# Patient Record
Sex: MALE | Race: WHITE | NOT HISPANIC OR LATINO | Employment: FULL TIME | URBAN - METROPOLITAN AREA
[De-identification: names, ages, dates, MRNs, and addresses within clinical notes are randomized per-mention and may not be internally consistent; named-entity substitution may affect disease eponyms.]

---

## 2018-07-06 ENCOUNTER — OFFICE VISIT CONVERTED (OUTPATIENT)
Dept: FAMILY MEDICINE CLINIC | Facility: CLINIC | Age: 38
End: 2018-07-06
Attending: FAMILY MEDICINE

## 2018-08-06 ENCOUNTER — OFFICE VISIT CONVERTED (OUTPATIENT)
Dept: FAMILY MEDICINE CLINIC | Facility: CLINIC | Age: 38
End: 2018-08-06
Attending: FAMILY MEDICINE

## 2018-09-20 ENCOUNTER — TELEPHONE (OUTPATIENT)
Dept: NEUROSURGERY | Facility: CLINIC | Age: 38
End: 2018-09-20

## 2018-09-20 NOTE — TELEPHONE ENCOUNTER
I called and LVM for patient regarding their new patient packet that was mailed out on 08/28/18. We have not received their packet and if we do not receive it by 9/21/18 by 5 pm, we will have to cancel their appointment. We ask that all new patient packets be back within 3 business days of their scheduled appointment.

## 2018-09-20 NOTE — TELEPHONE ENCOUNTER
Called and LVM about new patient packet and if we do not receive it tomorrow by 5 pm, we will cancel his appointment.

## 2020-07-17 ENCOUNTER — OFFICE VISIT CONVERTED (OUTPATIENT)
Dept: FAMILY MEDICINE CLINIC | Facility: CLINIC | Age: 40
End: 2020-07-17
Attending: FAMILY MEDICINE

## 2020-07-17 ENCOUNTER — TRANSCRIBE ORDERS (OUTPATIENT)
Dept: ADMINISTRATIVE | Facility: HOSPITAL | Age: 40
End: 2020-07-17

## 2020-07-17 DIAGNOSIS — R32 URINARY INCONTINENCE, UNSPECIFIED TYPE: ICD-10-CM

## 2020-07-17 DIAGNOSIS — M54.50 LOW BACK PAIN, UNSPECIFIED BACK PAIN LATERALITY, UNSPECIFIED CHRONICITY, UNSPECIFIED WHETHER SCIATICA PRESENT: Primary | ICD-10-CM

## 2020-07-30 ENCOUNTER — HOSPITAL ENCOUNTER (OUTPATIENT)
Dept: MRI IMAGING | Facility: HOSPITAL | Age: 40
Discharge: HOME | End: 2020-07-30
Admitting: FAMILY MEDICINE

## 2020-07-30 DIAGNOSIS — R32 URINARY INCONTINENCE, UNSPECIFIED TYPE: ICD-10-CM

## 2020-07-30 DIAGNOSIS — M54.50 LOW BACK PAIN, UNSPECIFIED BACK PAIN LATERALITY, UNSPECIFIED CHRONICITY, UNSPECIFIED WHETHER SCIATICA PRESENT: ICD-10-CM

## 2020-07-30 PROCEDURE — A9575 INJ GADOTERATE MEGLUMI 0.1ML: HCPCS | Performed by: FAMILY MEDICINE

## 2020-07-30 PROCEDURE — 72158 MRI LUMBAR SPINE W/O & W/DYE: CPT

## 2020-07-30 PROCEDURE — 25010000002 GADOTERATE MEGLUMINE 7.5 MMOL/15ML SOLUTION: Performed by: FAMILY MEDICINE

## 2020-07-30 RX ORDER — OXCARBAZEPINE 150 MG/1
150 TABLET, FILM COATED ORAL 2 TIMES DAILY
COMMUNITY

## 2020-07-30 RX ORDER — LORAZEPAM 1 MG/1
1 TABLET ORAL AS NEEDED
COMMUNITY

## 2020-07-30 RX ORDER — GADOTERATE MEGLUMINE 376.9 MG/ML
12 INJECTION INTRAVENOUS
Status: COMPLETED | OUTPATIENT
Start: 2020-07-30 | End: 2020-07-30

## 2020-07-30 RX ORDER — TRAZODONE HYDROCHLORIDE 50 MG/1
50 TABLET ORAL DAILY
COMMUNITY

## 2020-07-30 RX ADMIN — GADOTERATE MEGLUMINE 12 ML: 376.9 INJECTION, SOLUTION INTRAVENOUS at 11:40

## 2020-08-13 ENCOUNTER — TRANSCRIBE ORDERS (OUTPATIENT)
Dept: ADMINISTRATIVE | Facility: HOSPITAL | Age: 40
End: 2020-08-13

## 2020-08-13 DIAGNOSIS — M54.9 BILATERAL BACK PAIN, UNSPECIFIED BACK LOCATION, UNSPECIFIED CHRONICITY: Primary | ICD-10-CM

## 2020-08-27 ENCOUNTER — HOSPITAL ENCOUNTER (OUTPATIENT)
Dept: MRI IMAGING | Facility: HOSPITAL | Age: 40
Discharge: HOME | End: 2020-08-27
Admitting: FAMILY MEDICINE

## 2020-08-27 DIAGNOSIS — M54.9 BILATERAL BACK PAIN, UNSPECIFIED BACK LOCATION, UNSPECIFIED CHRONICITY: ICD-10-CM

## 2020-08-27 PROCEDURE — 72157 MRI CHEST SPINE W/O & W/DYE: CPT

## 2020-08-27 PROCEDURE — 25010000002 GADOTERATE MEGLUMINE 7.5 MMOL/15ML SOLUTION: Performed by: FAMILY MEDICINE

## 2020-08-27 PROCEDURE — A9575 INJ GADOTERATE MEGLUMI 0.1ML: HCPCS | Performed by: FAMILY MEDICINE

## 2020-08-27 RX ORDER — GADOTERATE MEGLUMINE 376.9 MG/ML
12 INJECTION INTRAVENOUS
Status: COMPLETED | OUTPATIENT
Start: 2020-08-27 | End: 2020-08-27

## 2020-08-27 RX ADMIN — GADOTERATE MEGLUMINE 12 ML: 376.9 INJECTION, SOLUTION INTRAVENOUS at 20:24

## 2020-12-16 ENCOUNTER — CONVERSION ENCOUNTER (OUTPATIENT)
Dept: NEUROLOGY | Facility: CLINIC | Age: 40
End: 2020-12-16

## 2020-12-16 ENCOUNTER — OFFICE VISIT CONVERTED (OUTPATIENT)
Dept: NEUROLOGY | Facility: CLINIC | Age: 40
End: 2020-12-16
Attending: PSYCHIATRY & NEUROLOGY

## 2020-12-21 ENCOUNTER — OFFICE VISIT CONVERTED (OUTPATIENT)
Dept: ORTHOPEDIC SURGERY | Facility: CLINIC | Age: 40
End: 2020-12-21
Attending: STUDENT IN AN ORGANIZED HEALTH CARE EDUCATION/TRAINING PROGRAM

## 2021-05-10 NOTE — H&P
History and Physical      Patient Name: Sandeep Mcdonald   Patient ID: [id number]   Sex: Male   YOB: 1980    Primary Care Provider: Nikunj Manning DO   Referring Provider: Nikunj Manning DO    Visit Date: December 21, 2020    Provider: Alvaro Bland MD   Location: [facility]   Location Address: [address] [place] [address]  [id number]   Location Phone: [phone]          Chief Complaint  · right elbow pain      History Of Present Illness  Sandeep Mcdonald is a 40 year old /White male who presents today to [facility].      Sandeep presents to the office today for evaluation of his right arm. He has been experiencing numbness and tingling of the palm, ring, and small fingers of his right hand since about 2015. He states that he had a cubital tunnel release preformed on the left side in 2015. He mentions that he had 3 shoulder and an ankle operation in the past. He also had a new job so it made it difficult to have time for his right arm to be examined. He states that he is trying to have it examined now before he has a change in his insurance. He has obtained an EMG, however it reveals it does not reveal and ulnar nerve compression. He mentions that he is right-hand dominant however due to the severity and frequency of the numbness he utilizes his left hand more now. He mentions he has attempted life style modifications and different positioning, however there has been no improvement. He explains that when he has his elbow flexed for longer than 5 minutes, he starts to find numbness and tingling radiating down the arm to the ring and small finger. He denies any associated weakness. He is a  and preforms computer editing. He mentions that he has been waking up during the night with his hand numb. He mentions that he has an appointment today with Dr. Cerna, a hand specialist in [place]. However he canceled that and presented to us due to having to  "wait until February for a time for a possible operation. He states that this pain/numbness/tingling is affecting his daily lifestyle now. He is a nonsmoker. He is non-diabetic. He is otherwise overall healthy.                 Past Medical History  Abdominal Pain, RUQ; Facial rash; Gastritis; Hemorrhoids; Paresthesia of left leg; Perennial allergic rhinitis; Shoulder Impingement; Shoulder pain; Swollen lymph nodes; torn labrum; Ulnar neuropathy         Past Surgical History  Ankle surgery; Biopsy of groin; EGD; Shoulder surgery         Medication List  ibuprofen 400 mg oral tablet; Tylenol oral         Allergy List  NO KNOWN DRUG ALLERGIES       Allergies Reconciled  Family Medical History  Spine Problems         Social History  Alcohol (Light); Exercises regularly; Tobacco (Never)         Review of Systems  · Constitutional  o Denies  o : fever, chills, weight loss  · Cardiovascular  o Denies  o : chest pain, shortness of breath  · Gastrointestinal  o Denies  o : liver disease, heartburn, nausea, blood in stools  · Genitourinary  o Denies  o : painful urination, blood in urine  · Integument  o Denies  o : rash, itching  · Neurologic  o Denies  o : headache, weakness, loss of consciousness  · Musculoskeletal  o Admits  o : right arm tingling and numbness.   · Psychiatric  o Denies  o : drug/alcohol addiction, anxiety, depression      Vitals  Date Time BP Position Site L\R Cuff Size HR RR TEMP (F) WT  HT  BMI kg/m2 BSA m2 O2 Sat FR L/min FiO2        12/21/2020 01:30 PM      99 - R   142lbs 0oz 5'  6\" 22.92 1.73 98 %            Physical Examination  · Constitutional  o Appearance  o : well developed, well-nourished, no obvious deformities present  · Head and Face  o Head  o :   § Inspection  § : normocephalic  o Face  o :   § Inspection  § : no facial lesions  · Eyes  o Conjunctivae  o : conjunctivae normal  o Sclerae  o : sclerae white  · Ears, Nose, Mouth and Throat  o Ears  o :   § External Ears  § : appearance " within normal limits  § Hearing  § : intact  o Nose  o :   § External Nose  § : appearance normal  · Neck  o Inspection/Palpation  o : normal appearance  o Range of Motion  o : full range of motion  · Respiratory  o Respiratory Effort  o : breathing unlabored  o Inspection of Chest  o : normal appearance  o Auscultation of Lungs  o : no audible wheezing or rales  · Cardiovascular  o Heart  o : regular rate  · Gastrointestinal  o Abdominal Examination  o : soft and non-tender  · Skin and Subcutaneous Tissue  o General Inspection  o : intact, no rashes  · Psychiatric  o General  o : Alert and oriented x3  o Judgement and Insight  o : judgment and insight intact  o Mood and Affect  o : mood normal, affect appropriate  · Right Arm  o Inspection  o : No wounds. No swelling. No bruises. No muscle atrophy in the hand. Negative Tinel's over the ulnar nerve in the cubital tunnel. With elbow flexion and compression across the cubital tunnel, there is increased tingling radiating to the ring and small fingers. No appreciable subluxation of the ulnar nerve is noted. Negative Phalen's over carpal tunnel. Motor function intact with AIN, PIN and ulnar nerve distributions with 5/5 strength. Paresthesias in the ulnar nerve distribution, but sensation remains intact to light touch in the median, radial, and ulnar nerve distributions. Palpable radial pulse.           Assessment  · Right elbow pain     719.42/M25.521  · Cubital tunnel syndrome     354.2/G56.20      Plan  · Medications  o Medications have been Reconciled  o Transition of Care or Provider Policy  · Instructions  o EMG reviewed by Dr. Bland.  o Reviewed the patient's Past Medical, Social, and Family history as well as the ROS at today's visit, no changes.  o Call or return if worsening symptoms.  o Follow Up PRN.  o The above service was scribed by Rissa Shahid on my behalf and I attest to the accuracy of the note. cb  o Sandeep has symptoms of paresthesias in the  ulnar nerve distribution of the right upper extremity. He has an EMG that does not show any static compression of the nerve at the cubital tunnel. His nerve does not grossly sublux on exam but it does have some increased mobility. My concern is this may be more of a dynamic compression type situation. A static cubital tunnel release may not fully treat his symptoms. He may require an ulnar nerve transposition. He already has contact with a upper extremity/hand specialist in [place]. He would like to follow-up with him for a second opinion. I think that is reasonable. He may call me with any concerns going forward.            Electronically Signed by: Alvaro Bland MD -Author on December 22, 2020 03:38:33 PM

## 2021-05-10 NOTE — H&P
History and Physical      Patient Name: Sandeep Mcdonald   Patient ID: [id number]   Sex: Male   YOB: 1980    Primary Care Provider: Nikunj Manning DO    Visit Date: December 16, 2020    Provider: Nguyễn Salas MD   Location: [facility]   Location Address: [address] [place] [address]  [id number]   Location Phone: [phone]          Chief Complaint     New pt visit for R elbow ulnar nerve entrapment.       History Of Present Illness  Sandeep Mcdonald is a 40 year old /White male who presents today to [facility] today referred from Nikunj Manning DO.      40-year-old man evaluated for right hand numbness and tingling for the past 5 years.  He states that he had ulnar neuropathy in the left elbow which had surgery several years ago and it helped him significantly.  He states that the symptoms are the same on the right hand and he is here for nerve conduction study.  He states that the pinky and ring finger is numb and he goes to the palm of his hand and sometimes in his medial forearm.  It happens when he is bending his arm.  He states that he cannot read a book he cannot do activities that he gets numb and tingly.  It can happen several times a day.       Past Medical History  Abdominal Pain, RUQ; Facial rash; Gastritis; Hemorrhoids; Paresthesia of left leg; Perennial allergic rhinitis; Shoulder Impingement; Shoulder pain; Swollen lymph nodes; torn labrum; Ulnar neuropathy         Past Surgical History  Ankle surgery; Biopsy of groin; EGD; Shoulder surgery         Medication List  ibuprofen 400 mg oral tablet; Tylenol oral         Allergy List  NO KNOWN DRUG ALLERGIES         Family Medical History  Spine Problems         Social History  Alcohol (Light); Exercises regularly; Tobacco (Never)         Review of Systems  · Constitutional  o Denies  o : chills, excessive sweating, fatigue, fever, sycope/passing out, weight gain, weight  "loss  · Eyes  o Denies  o : changes in vision, blurry vision, double vision  · HENT  o Denies  o : loss of hearing, ringing in the ears, ear aches, sore throat, nasal congestion, sinus pain, nose bleeds, seasonal allergies  · Cardiovascular  o Denies  o : blood clots, swollen legs, anemia, easy burising or bleeding, transfusions  · Respiratory  o Denies  o : shortness of breath, dry cough, productive cough, pneumonia, COPD  · Gastrointestinal  o Denies  o : difficulty swallowing, reflux  · Genitourinary  o Denies  o : incontinence  · Neurologic  o Admits  o : numbness/tingling/paresthesia , difficulty with dexterity  o Denies  o : headache, seizure, stroke, tremor, loss of balance, falls, dizziness/vertigo, difficulty with sleep, difficulty with coordination, weakness  · Musculoskeletal  o Admits  o : neck stiffness/pain, muscle aches, joint pain, spasms, sciatica, pain radiating in arm, pain radiating in leg, low back pain  o Denies  o : swollen lymph nodes, weakness  · Endocrine  o Denies  o : diabetes, thyroid disorder  · Psychiatric  o Denies  o : anxiety, depression      Vitals  Date Time BP Position Site L\R Cuff Size HR RR TEMP (F) WT  HT  BMI kg/m2 BSA m2 O2 Sat FR L/min FiO2 HC       12/16/2020 02:58 /72 Sitting    86 - R   141lbs 0oz 5'  6\" 22.76 1.73             Physical Examination     There is no weakness of the upper extremities and with muscle testing.  Specifically there is no weakness in the distribution of the ulnar nerve.  Reflex are normoactive and symmetrical in the triceps, biceps and brachioradialis.  Tinel's sign is negative at the left elbow.           Assessment  · Carpal tunnel syndrome     354.0/G56.00  I would also recommend for him to be conservatively treated for carpal tunnel symptoms since his symptoms of bending his wrist and elbows may contribute to his symptoms.    20 minutes was spent for this straightforward complexity encounter more half the time was spent face-to-face " with the patient for examination, counseling, planning and recommendations.  · Numbness and tingling       Anesthesia of skin     782.0/R20.0  Paresthesia of skin     782.0/R20.2  The study is normal and does not show electrophysiologic evidence for median neuropathy at the wrist or ulnar neuropathy at the elbow on the right arm.  · Ulnar neuropathy at elbow of right upper extremity     354.2/G56.21  I discussed with him that he should use conservative measures such as elbow splinting and avoid leaning his elbows as well as keeping his arm straight most of the time for the next several weeks to see if it helps his symptoms. He states that he is already tired of doing this and he just wants treatment. I discussed with him that I will leave it up to him and his orthopedic surgeon whether or not they are going to do decompression of the ulnar nerve at the elbow with a normal study.      Plan  · Orders  o Nerve conduction studies; 3-4 studies (17188) - 354.0/G56.00, 782.0/R20.0, 782.0/R20.2, 354.2/G56.21 - 12/16/2020  · Medications  o Medications have been Reconciled  o Transition of Care or Provider Policy  · Instructions  o Encouraged to follow-up with Primary Care Provider for preventative care.            Electronically Signed by: Nguyễn Salas MD -Author on December 16, 2020 03:45:15 PM

## 2021-05-13 NOTE — PROGRESS NOTES
Progress Note      Patient Name: Sandeep Mcdonald   Patient ID: [id number]   Sex: Male   YOB: 1980    Primary Care Provider: Nikunj Manning DO    Visit Date: July 17, 2020    Provider: Nikunj Manning DO   Location: [facility]   Location Address: [address] [place] [address]  [id number]   Location Phone: [phone]          Chief Complaint  · pt c/o low back pain, pt is currently seeing urology for all other issues      History Of Present Illness  Sandeep Mcdonald is a 39 year old /White male who presents for evaluation and treatment of: back pain. He states that he has chronic lower back pain. It has been getting worse over the past year. He has been going to PT as ordered by Chiropractor. He states that it did not provide any benefit. His last MRI was about 5 years ago. He has a lot of associated spasm. He has also had some urinary issues causing difficulty urinating.       Past Medical History  Disease Name Date Onset Notes   Abdominal Pain, RUQ 06/03/2016 His w/u so far was negative. Will check a HIDA scan and if negative to see GI for endoscopy.    Facial rash 05/06/2016 --    Gastritis 07/12/2016 --    Hemorrhoids 04/21/2017 --    Paresthesia of left leg 06/16/2017 --    Perennial allergic rhinitis 08/06/2018 --    Shoulder Impingement 02/23/2015 --    Shoulder pain --  --    Swollen lymph nodes --  --    torn labrum 01/20/2015 --    Ulnar neuropathy 05/06/2016 --          Past Surgical History  Procedure Name Date Notes   Ankle surgery 2014 left ankle , tendon and ligament   Biopsy of groin 2008 swollen lymphnode   EGD 2016 --    Shoulder surgery --  left shoulder 2001 , labrum tear right shoulder 2013, labrum left shoulder 2015, labrum         Medication List  Name Date Started Instructions   Ativan 1 mg oral tablet  take 1 tablet (1 mg) by oral [id number] daily   tamsulosin 0.4 mg oral capsule  take 1 capsule by oral route 2 times a day   trazodone 50  "mg oral tablet  take 1 tablet (50 mg) by oral route once daily at bedtime   Trileptal 150 mg oral tablet  take 1 tablet by oral route 2 times per day         Allergy List  Allergen Name Date Reaction Notes   NO KNOWN DRUG ALLERGIES --  --  --          Family Medical History  Disease Name Relative/Age Notes   Spine Problems  --          Social History  Finding Status Start/Stop Quantity Notes   Alcohol Never --/-- --  --    Exercises regularly --  --/-- --  --    Tobacco Never --/-- --  --          Review of Systems  · Constitutional  o Denies  o : fatigue  · Genitourinary  o Admits  o : urgency, frequency, incontinence, urinary retention, decreased stream  · Neurologic  o Admits  o : tingling or numbness  · Musculoskeletal  o Admits  o : back pain      Vitals  Date Time BP Position Site L\R Cuff Size HR RR TEMP (F) WT  HT  BMI kg/m2 BSA m2 O2 Sat HC       07/06/2018 12:19 /70 Sitting    87 - R   139lbs 0oz 5'  6\" 22.43 1.71     08/06/2018 02:54 /60 Sitting    79 - R   142lbs 0oz 5'  6\" 22.92 1.73     07/17/2020 02:07 /62 Sitting    103 - R  99.3 137lbs 0oz 5'  6\" 22.11 1.7 99 %          Physical Examination  · Constitutional  o Appearance  o : well-nourished, well developed, no obvious deformities present  · Gastrointestinal  o Abdominal Examination  o : diffuse abdominal tenderness to palpation present, normal bowel sounds, tone normal without rigidity or guarding, no masses present  o Liver and spleen  o : no hepatomegaly present  · Neurologic  o Motor Examination  o :   § RLE Strength  § : strength normal  § LLE Strength  § : strength normal  o Reflexes  o :   § RLE  § : patellar reflex 2, ankle reflex 2  § LLE  § : patellar reflex 2, ankle reflex 2  o Sensation  o :   § Light Touch  § : sensation intact to light touch in extremities  · Back  o Inspection  o : no deformities  o Palpation  o : tednerness present           Assessment  · Screening for depression     V79.0/Z13.89  · Lower back " pain     724.2/M54.5  Needs a MRI to r/o lumbar etiology  · Urinary incontinence     788.30/R32  as above      Plan  · Orders  o ACO-18: Negative screen for clinical depression using a standardized tool () - V79.0/Z13.89 - 07/17/2020  o ACO-39: Current medications updated and reviewed () - - 07/17/2020  o MRI lumbar spine w contrast (45880) - 724.2/M54.5, 788.30/R32 - 07/17/2020  · Medications  o cyclobenzaprine 5 mg oral tablet   SIG: take 1 tablet by oral route every 8 hours as needed for 10 days   DISP: (30) tablets with 0 refills  Prescribed on 07/17/2020     o Medications have been Reconciled  o Transition of Care or Provider Policy  · Instructions  o Depression Screen completed and scanned into the EMR under the designated folder within the patient's documents.  o Today's PHQ-9 result is 2  o Patient is taking medications as prescribed and doing well.   o Take all medications as prescribed/directed.  o Patient instructed/educated on their diet and exercise program.  o Patient was educated/instructed on their diagnosis, treatment and medications prior to discharge from the clinic today.  o Patient instructed to seek medical attention urgently for new or worsening symptoms.  o Call the office with any concerns or questions.  o Bring all medicines with their bottles to each office visit.  · Disposition  o Call or Return if symptoms worsen or persist.  o f/u after testing            Electronically Signed by: Nikunj Manning, DO -Author on July 17, 2020 02:33:39 PM

## 2021-05-14 VITALS
WEIGHT: 141 LBS | DIASTOLIC BLOOD PRESSURE: 72 MMHG | HEIGHT: 66 IN | BODY MASS INDEX: 22.66 KG/M2 | SYSTOLIC BLOOD PRESSURE: 117 MMHG | HEART RATE: 86 BPM

## 2021-05-14 VITALS — OXYGEN SATURATION: 98 % | HEART RATE: 99 BPM | HEIGHT: 66 IN | WEIGHT: 142 LBS | BODY MASS INDEX: 22.82 KG/M2

## 2021-05-15 VITALS
DIASTOLIC BLOOD PRESSURE: 62 MMHG | HEART RATE: 103 BPM | OXYGEN SATURATION: 99 % | HEIGHT: 66 IN | TEMPERATURE: 99.3 F | SYSTOLIC BLOOD PRESSURE: 110 MMHG | BODY MASS INDEX: 22.02 KG/M2 | WEIGHT: 137 LBS

## 2021-05-16 VITALS
DIASTOLIC BLOOD PRESSURE: 70 MMHG | WEIGHT: 139 LBS | HEART RATE: 87 BPM | BODY MASS INDEX: 22.34 KG/M2 | HEIGHT: 66 IN | SYSTOLIC BLOOD PRESSURE: 110 MMHG

## 2021-05-16 VITALS
DIASTOLIC BLOOD PRESSURE: 60 MMHG | HEIGHT: 66 IN | HEART RATE: 79 BPM | BODY MASS INDEX: 22.82 KG/M2 | SYSTOLIC BLOOD PRESSURE: 109 MMHG | WEIGHT: 142 LBS

## 2021-12-02 ENCOUNTER — OFFICE VISIT (OUTPATIENT)
Dept: FAMILY MEDICINE CLINIC | Facility: CLINIC | Age: 41
End: 2021-12-02

## 2021-12-02 VITALS
TEMPERATURE: 97.6 F | HEIGHT: 66 IN | SYSTOLIC BLOOD PRESSURE: 98 MMHG | BODY MASS INDEX: 22.18 KG/M2 | OXYGEN SATURATION: 98 % | HEART RATE: 70 BPM | DIASTOLIC BLOOD PRESSURE: 71 MMHG | WEIGHT: 138 LBS

## 2021-12-02 DIAGNOSIS — R11.0 NAUSEA: ICD-10-CM

## 2021-12-02 DIAGNOSIS — R19.7 DIARRHEA, UNSPECIFIED TYPE: ICD-10-CM

## 2021-12-02 DIAGNOSIS — R10.11 RIGHT UPPER QUADRANT ABDOMINAL PAIN: ICD-10-CM

## 2021-12-02 DIAGNOSIS — R14.0 BLOATING: ICD-10-CM

## 2021-12-02 DIAGNOSIS — R21 RASH: ICD-10-CM

## 2021-12-02 PROBLEM — M25.519 SHOULDER PAIN: Status: ACTIVE | Noted: 2021-12-02

## 2021-12-02 PROBLEM — M19.90 ARTHRITIS: Status: ACTIVE | Noted: 2021-12-02

## 2021-12-02 PROBLEM — K64.9 HEMORRHOIDS: Status: ACTIVE | Noted: 2017-04-21

## 2021-12-02 PROBLEM — J30.89 PERENNIAL ALLERGIC RHINITIS: Status: ACTIVE | Noted: 2018-08-06

## 2021-12-02 PROBLEM — R20.2 PARESTHESIA OF LOWER EXTREMITY: Status: ACTIVE | Noted: 2017-06-16

## 2021-12-02 LAB
ALBUMIN SERPL-MCNC: 4.9 G/DL (ref 3.5–5.2)
ALBUMIN/GLOB SERPL: 1.8 G/DL
ALP SERPL-CCNC: 65 U/L (ref 39–117)
ALT SERPL W P-5'-P-CCNC: 35 U/L (ref 1–41)
AMYLASE SERPL-CCNC: 65 U/L (ref 28–100)
ANION GAP SERPL CALCULATED.3IONS-SCNC: 14.1 MMOL/L (ref 5–15)
AST SERPL-CCNC: 21 U/L (ref 1–40)
BASOPHILS # BLD AUTO: 0.07 10*3/MM3 (ref 0–0.2)
BASOPHILS NFR BLD AUTO: 0.8 % (ref 0–1.5)
BILIRUB SERPL-MCNC: 0.8 MG/DL (ref 0–1.2)
BUN SERPL-MCNC: 17 MG/DL (ref 6–20)
BUN/CREAT SERPL: 18.7 (ref 7–25)
CALCIUM SPEC-SCNC: 9.8 MG/DL (ref 8.6–10.5)
CHLORIDE SERPL-SCNC: 102 MMOL/L (ref 98–107)
CO2 SERPL-SCNC: 27.9 MMOL/L (ref 22–29)
CREAT SERPL-MCNC: 0.91 MG/DL (ref 0.76–1.27)
DEPRECATED RDW RBC AUTO: 39.7 FL (ref 37–54)
EOSINOPHIL # BLD AUTO: 0.19 10*3/MM3 (ref 0–0.4)
EOSINOPHIL NFR BLD AUTO: 2.2 % (ref 0.3–6.2)
ERYTHROCYTE [DISTWIDTH] IN BLOOD BY AUTOMATED COUNT: 11.7 % (ref 12.3–15.4)
GFR SERPL CREATININE-BSD FRML MDRD: 92 ML/MIN/1.73
GLOBULIN UR ELPH-MCNC: 2.7 GM/DL
GLUCOSE SERPL-MCNC: 87 MG/DL (ref 65–99)
HCT VFR BLD AUTO: 43.5 % (ref 37.5–51)
HGB BLD-MCNC: 15 G/DL (ref 13–17.7)
IMM GRANULOCYTES # BLD AUTO: 0.02 10*3/MM3 (ref 0–0.05)
IMM GRANULOCYTES NFR BLD AUTO: 0.2 % (ref 0–0.5)
LIPASE SERPL-CCNC: 31 U/L (ref 13–60)
LYMPHOCYTES # BLD AUTO: 2.53 10*3/MM3 (ref 0.7–3.1)
LYMPHOCYTES NFR BLD AUTO: 28.8 % (ref 19.6–45.3)
MCH RBC QN AUTO: 31.9 PG (ref 26.6–33)
MCHC RBC AUTO-ENTMCNC: 34.5 G/DL (ref 31.5–35.7)
MCV RBC AUTO: 92.6 FL (ref 79–97)
MONOCYTES # BLD AUTO: 0.72 10*3/MM3 (ref 0.1–0.9)
MONOCYTES NFR BLD AUTO: 8.2 % (ref 5–12)
NEUTROPHILS NFR BLD AUTO: 5.25 10*3/MM3 (ref 1.7–7)
NEUTROPHILS NFR BLD AUTO: 59.8 % (ref 42.7–76)
NRBC BLD AUTO-RTO: 0 /100 WBC (ref 0–0.2)
PLATELET # BLD AUTO: 412 10*3/MM3 (ref 140–450)
PMV BLD AUTO: 10.3 FL (ref 6–12)
POTASSIUM SERPL-SCNC: 4.7 MMOL/L (ref 3.5–5.2)
PROT SERPL-MCNC: 7.6 G/DL (ref 6–8.5)
RBC # BLD AUTO: 4.7 10*6/MM3 (ref 4.14–5.8)
SODIUM SERPL-SCNC: 144 MMOL/L (ref 136–145)
WBC NRBC COR # BLD: 8.78 10*3/MM3 (ref 3.4–10.8)

## 2021-12-02 PROCEDURE — 96372 THER/PROPH/DIAG INJ SC/IM: CPT | Performed by: NURSE PRACTITIONER

## 2021-12-02 PROCEDURE — 82150 ASSAY OF AMYLASE: CPT | Performed by: NURSE PRACTITIONER

## 2021-12-02 PROCEDURE — 99214 OFFICE O/P EST MOD 30 MIN: CPT | Performed by: NURSE PRACTITIONER

## 2021-12-02 PROCEDURE — 83690 ASSAY OF LIPASE: CPT | Performed by: NURSE PRACTITIONER

## 2021-12-02 PROCEDURE — 80053 COMPREHEN METABOLIC PANEL: CPT | Performed by: NURSE PRACTITIONER

## 2021-12-02 PROCEDURE — 86677 HELICOBACTER PYLORI ANTIBODY: CPT | Performed by: NURSE PRACTITIONER

## 2021-12-02 PROCEDURE — 36415 COLL VENOUS BLD VENIPUNCTURE: CPT | Performed by: NURSE PRACTITIONER

## 2021-12-02 PROCEDURE — 85025 COMPLETE CBC W/AUTO DIFF WBC: CPT | Performed by: NURSE PRACTITIONER

## 2021-12-02 RX ORDER — TAMSULOSIN HYDROCHLORIDE 0.4 MG/1
1 CAPSULE ORAL DAILY
COMMUNITY
Start: 2021-10-27

## 2021-12-02 RX ORDER — METHYLPREDNISOLONE ACETATE 80 MG/ML
80 INJECTION, SUSPENSION INTRA-ARTICULAR; INTRALESIONAL; INTRAMUSCULAR; SOFT TISSUE ONCE
Status: COMPLETED | OUTPATIENT
Start: 2021-12-02 | End: 2021-12-02

## 2021-12-02 RX ORDER — CLOBETASOL PROPIONATE 0.5 MG/G
1 CREAM TOPICAL 2 TIMES DAILY
Qty: 60 G | Refills: 1 | Status: SHIPPED | OUTPATIENT
Start: 2021-12-02

## 2021-12-02 RX ADMIN — METHYLPREDNISOLONE ACETATE 80 MG: 80 INJECTION, SUSPENSION INTRA-ARTICULAR; INTRALESIONAL; INTRAMUSCULAR; SOFT TISSUE at 10:20

## 2021-12-02 NOTE — PROGRESS NOTES
Patient Name: Sandeep Mcdonald  : 1980   MRN: [id number]     Chief Complaint:    Chief Complaint   Patient presents with   • Abdominal Pain     right side    • Rash       History of Present Illness: Sandeep Mcdonald is a 41 y.o. male who is here today for   C/O-abdominal pain RUQ with nausea and diarrhea   She reports the diarrhea has been going on for several years progressively worse   no colonoscopy   Sharp RUQ about 8 weeks prior, increase in stress in the past few months  Also c/o rash for the past 8 weeks to 10 weeks bilateral hands arms behind knees  Rare etoh use   Subjective      Review of Systems:   Review of Systems   Constitutional: Negative for chills and fever.   Respiratory: Negative for cough.    Cardiovascular: Negative for chest pain.   Gastrointestinal: Positive for abdominal pain, diarrhea and nausea.   Genitourinary: Negative for dysuria.        Past Medical History:   Past Medical History:   Diagnosis Date   • Back pain    • Urinary incontinence    • Urinary retention        Past Surgical History:   Past Surgical History:   Procedure Laterality Date   • ANKLE SURGERY Left    • SHOULDER SURGERY Bilateral    • ULNAR NERVE TRANSPOSITION Left        Family History: No family history on file.    Social History:   Social History     Socioeconomic History   • Marital status: Single   Tobacco Use   • Smoking status: Never Smoker   • Smokeless tobacco: Never Used       Medications:     Current Outpatient Medications:   •  Cetirizine HCl 10 MG capsule, Take 10 mg by mouth every night at bedtime., Disp: 90 capsule, Rfl: 1  •  clobetasol (TEMOVATE) 0.05 % cream, Apply 1 application topically to the appropriate area as directed 2 (Two) Times a Day. Apply thin layer to affected area three times daily, Disp: 60 g, Rfl: 1  •  LORazepam (ATIVAN) 1 MG tablet, Take 1 mg by mouth As Needed for Anxiety., Disp: , Rfl:   •  OXcarbazepine (TRILEPTAL) 150 MG tablet, Take 150 mg by mouth 2 (Two) Times a  "Day., Disp: , Rfl:   •  tamsulosin (FLOMAX) 0.4 MG capsule 24 hr capsule, Take 1 capsule by mouth Daily., Disp: , Rfl:   •  traZODone (DESYREL) 50 MG tablet, Take 50 mg by mouth Daily., Disp: , Rfl:   No current facility-administered medications for this visit.    Allergies:   No Known Allergies      Objective     Physical Exam:  Vital Signs:   Vitals:    12/02/21 0939   BP: 98/71   Pulse: 70   Temp: 97.6 °F (36.4 °C)   SpO2: 98%   Weight: 62.6 kg (138 lb)   Height: 167.6 cm (66\")     Body mass index is 22.27 kg/m².     Physical Exam  Cardiovascular:      Rate and Rhythm: Normal rate and regular rhythm.      Heart sounds: Normal heart sounds. No murmur heard.      Pulmonary:      Effort: Pulmonary effort is normal.      Breath sounds: Normal breath sounds.   Abdominal:      General: Bowel sounds are normal.      Palpations: Abdomen is soft.      Tenderness: There is abdominal tenderness.      Comments: RUQ tender to palpation   Musculoskeletal:      Right lower leg: No edema.      Left lower leg: No edema.   Skin:     General: Skin is warm and dry.      Comments: Erythematous plaques bilateral hands, elbows   Neurological:      Mental Status: He is alert.   Psychiatric:         Mood and Affect: Mood normal.         Behavior: Behavior normal.             Assessment / Plan      Assessment/Plan:   Diagnoses and all orders for this visit:    1. Right upper quadrant abdominal pain  -     US Gallbladder; Future  -     Amylase  -     Lipase  -     Helicobacter Pylori, IgA IgG IgM  -     CBC & Differential  -     Clostridium Difficile Toxin - Stool, Per Rectum  -     Ova & Parasite Examination - Stool, Per Rectum  -     Comprehensive Metabolic Panel    2. Nausea  -     US Gallbladder; Future  -     Amylase  -     Lipase  -     Helicobacter Pylori, IgA IgG IgM  -     CBC & Differential  -     Clostridium Difficile Toxin - Stool, Per Rectum  -     Ova & Parasite Examination - Stool, Per Rectum  -     Comprehensive Metabolic " Panel    3. Diarrhea, unspecified type  -     US Gallbladder; Future  -     Amylase  -     Lipase  -     Helicobacter Pylori, IgA IgG IgM  -     CBC & Differential  -     Clostridium Difficile Toxin - Stool, Per Rectum  -     Ova & Parasite Examination - Stool, Per Rectum  -     Comprehensive Metabolic Panel  -     Ambulatory Referral to General Surgery    4. Bloating  -     US Gallbladder; Future  -     Amylase  -     Lipase  -     Helicobacter Pylori, IgA IgG IgM  -     CBC & Differential  -     Clostridium Difficile Toxin - Stool, Per Rectum  -     Ova & Parasite Examination - Stool, Per Rectum  -     Comprehensive Metabolic Panel  -     Ambulatory Referral to General Surgery    5. Rash  -     methylPREDNISolone acetate (DEPO-medrol) injection 80 mg    Other orders  -     Cetirizine HCl 10 MG capsule; Take 10 mg by mouth every night at bedtime.  Dispense: 90 capsule; Refill: 1  -     clobetasol (TEMOVATE) 0.05 % cream; Apply 1 application topically to the appropriate area as directed 2 (Two) Times a Day. Apply thin layer to affected area three times daily  Dispense: 60 g; Refill: 1       Right upper quadrant pain will obtain gallbladder ultrasound labs  Chronic diarrhea will refer to general surgery for colonoscopy  Rash likely eczema will treat with Depo-Medrol 80 in office start Zyrtec daily and provide clobetasol Rheem      Follow Up:   Return if symptoms worsen or fail to improve.    SAMMY Khan      Please note that portions of this note were completed with a voice recognition program.

## 2021-12-03 LAB
H PYLORI IGA SER-ACNC: <9 UNITS (ref 0–8.9)
H PYLORI IGG SER IA-ACNC: 0.1 INDEX VALUE (ref 0–0.79)
H PYLORI IGM SER-ACNC: <9 UNITS (ref 0–8.9)

## 2021-12-10 ENCOUNTER — APPOINTMENT (OUTPATIENT)
Dept: ULTRASOUND IMAGING | Facility: HOSPITAL | Age: 41
End: 2021-12-10

## 2021-12-27 ENCOUNTER — TELEPHONE (OUTPATIENT)
Dept: FAMILY MEDICINE CLINIC | Facility: CLINIC | Age: 41
End: 2021-12-27

## 2021-12-27 NOTE — TELEPHONE ENCOUNTER
LDM to patient that he still has pending stool studies in his chart and if he wishes to still have them done to drop it off to us or if he feels he no longer needs it to give us a call so we can cancel the orders.

## 2022-12-28 ENCOUNTER — TELEPHONE (OUTPATIENT)
Dept: ORTHOPEDIC SURGERY | Facility: CLINIC | Age: 42
End: 2022-12-28

## 2022-12-28 NOTE — TELEPHONE ENCOUNTER
Spray]    


 


[id number]   


 


Adrian   


 


Troponin I   


 


Ur Specific Gravity   


 


Adrian 


 


Troponin I 


 


Ur Specific Gravity 


 


Maricel, I am sending this to you if you could watch for it.    Left message for patient to return call to office or we will call back shortly.    Dr. Lindsey agreed to work him in next Friday.  Wanted to explain to patient that he will be double booked and may have to wait, but we will book him.    Was thinking around 3pm would be a good time.    It is his shoulder.  Need to find out which side.

## 2023-01-06 ENCOUNTER — OFFICE VISIT (OUTPATIENT)
Dept: ORTHOPEDIC SURGERY | Facility: CLINIC | Age: 43
End: 2023-01-06
Payer: COMMERCIAL

## 2023-01-06 VITALS — BODY MASS INDEX: 22.9 KG/M2 | TEMPERATURE: 97.1 F | HEIGHT: 66 IN | WEIGHT: 142.5 LBS | RESPIRATION RATE: 12 BRPM

## 2023-01-06 DIAGNOSIS — R52 PAIN: Primary | ICD-10-CM

## 2023-01-06 DIAGNOSIS — M25.511 CHRONIC RIGHT SHOULDER PAIN: ICD-10-CM

## 2023-01-06 DIAGNOSIS — G89.29 CHRONIC RIGHT SHOULDER PAIN: ICD-10-CM

## 2023-01-06 PROCEDURE — 99203 OFFICE O/P NEW LOW 30 MIN: CPT | Performed by: ORTHOPAEDIC SURGERY

## 2023-01-06 PROCEDURE — 73030 X-RAY EXAM OF SHOULDER: CPT | Performed by: ORTHOPAEDIC SURGERY

## 2023-01-06 RX ORDER — GABAPENTIN 300 MG/1
300 CAPSULE ORAL 4 TIMES DAILY
COMMUNITY
Start: 2022-11-25

## 2023-01-06 NOTE — PROGRESS NOTES
Patient: Sandeep Mcdonald    YOB: 1980    Medical Record Number: [id number]    Chief Complaints:   Right shoulder pain    History of Present Illness:     42 y.o. male patient who presents with a complaint of right shoulder pain. He reports that the symptoms first started when he went on a camping trip around October.  He was picking up a pack with his right arm when he felt something pop in his shoulder.  Since then he has been having pain, popping and clicking.  The pain is similar to a problem he was having about 7 or 8 years ago prior to a labral repair that was performed by Dr. Bowling in [facility].  He says the shoulder was fine until this recent incident.  Pain is primarily in the front of the shoulder and he describes it as deep.  His pain is described as moderate, intermittent and stabbing. He reports associated intermittent popping but denies any instability.  The pain is worse with certain reaching and lifting movements. He denies any alleviating factors. He denies any shooting pain down the arm, weakness, numbness or paresthesias.    Allergies: No Known Allergies    Home Medications:    Current Outpatient Medications:   •  gabapentin (NEURONTIN) 300 MG capsule, Take 300 mg by mouth 4 (Four) Times a Day., Disp: , Rfl:   •  Cetirizine HCl 10 MG capsule, Take 10 mg by mouth every night at bedtime., Disp: 90 capsule, Rfl: 1  •  clobetasol (TEMOVATE) 0.05 % cream, Apply 1 application topically to the appropriate area as directed 2 (Two) Times a Day. Apply thin layer to affected area three times daily, Disp: 60 g, Rfl: 1  •  LORazepam (ATIVAN) 1 MG tablet, Take 1 mg by mouth As Needed for Anxiety., Disp: , Rfl:   •  OXcarbazepine (TRILEPTAL) 150 MG tablet, Take 150 mg by mouth 2 (Two) Times a Day., Disp: , Rfl:   •  tamsulosin (FLOMAX) 0.4 MG capsule 24 hr capsule, Take 1 capsule by mouth Daily., Disp: , Rfl:   •  traZODone (DESYREL) 50 MG tablet, Take 50 mg by mouth Daily., Disp: , Rfl:     Past  Medical History:   Diagnosis Date   • Anxiety    • Back pain    • Urinary incontinence    • Urinary retention        Past Surgical History:   Procedure Laterality Date   • ANKLE SURGERY Left    • SHOULDER SURGERY Bilateral    • ULNAR NERVE TRANSPOSITION Left        Social History     Occupational History   • Not on file   Tobacco Use   • Smoking status: Never   • Smokeless tobacco: Never   Vaping Use   • Vaping Use: Never used   Substance and Sexual Activity   • Alcohol use: Not Currently   • Drug use: Not on file   • Sexual activity: Not on file      Social History     Social History Narrative   • Not on file       History reviewed. No pertinent family history.    Review of Systems:      Constitutional: Denies fever, shaking or chills   Eyes: Denies change in visual acuity   HEENT: Denies nasal congestion or sore throat   Respiratory: Denies cough or shortness of breath   Cardiovascular: Denies chest pain or edema  Endocrine: Denies tremors, palpitations, intolerance of heat or cold, polyuria, polydipsia.  GI: Denies abdominal pain, nausea, vomiting, bloody stools or diarrhea  : Denies frequency, urgency, incontinence, retention, or nocturia.  Musculoskeletal: Denies numbness, tingling or loss of motor function except as above  Integument: Denies rash, lesion or ulceration   Neurologic: Denies headache or focal weakness, deficits  Heme: Denies spontaneous or excessive bleeding, epistaxis, hematuria, melena, fatigue, enlarged or tender lymph nodes.      All other pertinent positives and negatives as noted above in HPI.    Physical Exam:   42 y.o. male  Vitals:    01/06/23 1535   Resp: 12   Temp: 97.1 °F (36.2 °C)   Weight: 64.6 kg (142 lb 8 oz)   Height: 167.6 cm (66\")     General:  Patient is awake and alert.  Appears in no acute distress or discomfort.    Psych:  Affect and demeanor are appropriate.    Eyes:  Conjunctiva and sclera appear grossly normal.  Eyes track well and EOM seem to be intact.    Ears:  No  gross abnormalities.  Hearing adequate for the exam.    Dentition:  No gross abnormalities noted.    Cardiovascular:  Regular rate and rhythm.    Lungs:  Good chest expansion.  Breathing unlabored.    Lymph:  No palpable adenopathy about neck or axilla.    Neck:  Supple.  Normal ROM.  Negative Spurling's for shoulder or arm pain.    Right shoulder is examined.  Skin is benign.  Well-healed surgical scars consistent with his history.  No gross abnormalities on inspection including any atrophy, swellings, or masses.  No palpable masses or adenopathy.  No focal areas of exquisite tenderness.  Full shoulder motion.  No evident instability or apprehension.  Positive O'Briens maneuver which reproduces his typical pain.  Good strength in the rotator cuff, deltoid, biceps, triceps, and .  Intact sensation throughout the arm.  Brisk cap refill.  Palpable radial pulse.  Good skin turgor.          Radiology:  AP, scapular Y, and axillary views of the right shoulder are ordered by myself and reviewed to evaluate the patient's complaint.  No comparison films are immediately available.  The x-rays show postsurgical changes in the glenoid consistent with his history of previous labral repair.  Otherwise, there are no obvious acute abnormalities, lesions, masses, significant glenohumeral degenerative changes, or other concerning findings.  The acromiohumeral interval is normal.  Glenoid version appears normal as well.    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  He is very concerned that he may have a recurrent tear.  We talked about a trial of conservative treatment versus further work-up.  He wants to pursue further work-up with an eye towards potential surgical options.  I will order an MR arthrogram for him.  I told him I will call him when I see the results.    Tommie MONTENEGRO  MD [place]    01/06/2023    CC to Nikunj Manning DO

## 2023-01-25 ENCOUNTER — HOSPITAL ENCOUNTER (OUTPATIENT)
Dept: MRI IMAGING | Facility: HOSPITAL | Age: 43
Discharge: HOME | End: 2023-01-25
Payer: COMMERCIAL

## 2023-01-25 ENCOUNTER — HOSPITAL ENCOUNTER (OUTPATIENT)
Dept: GENERAL RADIOLOGY | Facility: HOSPITAL | Age: 43
Discharge: HOME | End: 2023-01-25
Payer: COMMERCIAL

## 2023-01-25 DIAGNOSIS — M25.511 CHRONIC RIGHT SHOULDER PAIN: ICD-10-CM

## 2023-01-25 DIAGNOSIS — G89.29 CHRONIC RIGHT SHOULDER PAIN: ICD-10-CM

## 2023-01-25 PROCEDURE — 77002 NEEDLE LOCALIZATION BY XRAY: CPT

## 2023-01-25 PROCEDURE — 25010000002 IOPAMIDOL 61 % SOLUTION: Performed by: ORTHOPAEDIC SURGERY

## 2023-01-25 PROCEDURE — 73222 MRI JOINT UPR EXTREM W/DYE: CPT

## 2023-01-25 PROCEDURE — 0 LIDOCAINE 1 % SOLUTION: Performed by: ORTHOPAEDIC SURGERY

## 2023-01-25 PROCEDURE — 0 GADOBENATE DIMEGLUMINE 529 MG/ML SOLUTION: Performed by: ORTHOPAEDIC SURGERY

## 2023-01-25 PROCEDURE — A9577 INJ MULTIHANCE: HCPCS | Performed by: ORTHOPAEDIC SURGERY

## 2023-01-25 RX ORDER — LIDOCAINE HYDROCHLORIDE 10 MG/ML
10 INJECTION, SOLUTION INFILTRATION; PERINEURAL ONCE
Status: COMPLETED | OUTPATIENT
Start: 2023-01-25 | End: 2023-01-25

## 2023-01-25 RX ADMIN — IOPAMIDOL 5 ML: 612 INJECTION, SOLUTION INTRAVENOUS at 08:54

## 2023-01-25 RX ADMIN — GADOBENATE DIMEGLUMINE 0.05 ML: 529 INJECTION, SOLUTION INTRAVENOUS at 08:54

## 2023-01-25 RX ADMIN — LIDOCAINE HYDROCHLORIDE 2 ML: 10 INJECTION, SOLUTION INFILTRATION; PERINEURAL at 08:54

## 2023-02-01 ENCOUNTER — TELEPHONE (OUTPATIENT)
Dept: ORTHOPEDIC SURGERY | Facility: HOSPITAL | Age: 43
End: 2023-02-01
Payer: COMMERCIAL

## 2023-02-03 ENCOUNTER — TELEPHONE (OUTPATIENT)
Dept: ORTHOPEDIC SURGERY | Facility: HOSPITAL | Age: 43
End: 2023-02-03
Payer: COMMERCIAL

## 2023-02-03 NOTE — TELEPHONE ENCOUNTER
Caller: FERNANDA ANDREA    Relationship: SELF    Best call back number: [phone]    What is the best time to reach you: ANYTIME    Who are you requesting to speak with (clinical staff, provider,  specific staff member): PROVIDER    Do you know the name of the person who called: DONAVON MCLAIN    What was the call regarding: PATIENT STATES MRI RESULT    Do you require a callback: YES PLEASE  HUB ATTEMPTED A WARM TRANSFER

## 2023-02-03 NOTE — TELEPHONE ENCOUNTER
I tried to contact him with his MRI results.  I was unable to reach him and left a brief message.  I told him we will try again next week.

## 2023-02-06 ENCOUNTER — PREP FOR SURGERY (OUTPATIENT)
Dept: OTHER | Facility: HOSPITAL | Age: 43
End: 2023-02-06
Payer: COMMERCIAL

## 2023-02-06 ENCOUNTER — TELEPHONE (OUTPATIENT)
Dept: ORTHOPEDIC SURGERY | Facility: CLINIC | Age: 43
End: 2023-02-06
Payer: COMMERCIAL

## 2023-02-06 DIAGNOSIS — M25.511 CHRONIC RIGHT SHOULDER PAIN: Primary | ICD-10-CM

## 2023-02-06 DIAGNOSIS — G89.29 CHRONIC RIGHT SHOULDER PAIN: Primary | ICD-10-CM

## 2023-02-06 RX ORDER — CEFAZOLIN SODIUM 2 G/100ML
2 INJECTION, SOLUTION INTRAVENOUS ONCE
Status: CANCELLED | OUTPATIENT
Start: 2023-02-06 | End: 2023-02-06

## 2023-02-06 NOTE — TELEPHONE ENCOUNTER
I spoke with him and gave him the MRI results.  We had a long discussion about his options and how to proceed.  He he says the symptoms are very similar to what he had prior to his previous labral repair done several years ago in [facility].  We discussed surgical and nonsurgical treatment.  He wants to move forward with the surgery.  I will go ahead and have my  contact him but I want to see him back in the office prior to that for a discussion face-to-face.  He is in agreement with that plan.

## 2023-04-22 ENCOUNTER — PREP FOR SURGERY (OUTPATIENT)
Dept: OTHER | Facility: HOSPITAL | Age: 43
End: 2023-04-22
Payer: COMMERCIAL

## 2023-04-22 DIAGNOSIS — M25.511 CHRONIC RIGHT SHOULDER PAIN: Primary | ICD-10-CM

## 2023-04-22 DIAGNOSIS — G89.29 CHRONIC RIGHT SHOULDER PAIN: Primary | ICD-10-CM

## 2023-04-22 RX ORDER — CEFAZOLIN SODIUM 2 G/100ML
2 INJECTION, SOLUTION INTRAVENOUS ONCE
OUTPATIENT
Start: 2023-04-22 | End: 2023-04-22

## 2023-06-15 ENCOUNTER — PRE-ADMISSION TESTING (OUTPATIENT)
Dept: PREADMISSION TESTING | Facility: HOSPITAL | Age: 43
End: 2023-06-15
Payer: COMMERCIAL

## 2023-06-15 VITALS
DIASTOLIC BLOOD PRESSURE: 62 MMHG | TEMPERATURE: 97.9 F | OXYGEN SATURATION: 100 % | SYSTOLIC BLOOD PRESSURE: 105 MMHG | RESPIRATION RATE: 16 BRPM | WEIGHT: 144.6 LBS | HEART RATE: 88 BPM | BODY MASS INDEX: 23.24 KG/M2 | HEIGHT: 66 IN

## 2023-06-15 LAB
ANION GAP SERPL CALCULATED.3IONS-SCNC: 7 MMOL/L (ref 5–15)
BUN SERPL-MCNC: 18 MG/DL (ref 6–20)
BUN/CREAT SERPL: 19.6 (ref 7–25)
CALCIUM SPEC-SCNC: 9.2 MG/DL (ref 8.6–10.5)
CHLORIDE SERPL-SCNC: 103 MMOL/L (ref 98–107)
CO2 SERPL-SCNC: 30 MMOL/L (ref 22–29)
CREAT SERPL-MCNC: 0.92 MG/DL (ref 0.76–1.27)
DEPRECATED RDW RBC AUTO: 40.2 FL (ref 37–54)
EGFRCR SERPLBLD CKD-EPI 2021: 107.2 ML/MIN/1.73
ERYTHROCYTE [DISTWIDTH] IN BLOOD BY AUTOMATED COUNT: 11.8 % (ref 12.3–15.4)
GLUCOSE SERPL-MCNC: 104 MG/DL (ref 65–99)
HCT VFR BLD AUTO: 43.2 % (ref 37.5–51)
HGB BLD-MCNC: 14.5 G/DL (ref 13–17.7)
MCH RBC QN AUTO: 31.1 PG (ref 26.6–33)
MCHC RBC AUTO-ENTMCNC: 33.6 G/DL (ref 31.5–35.7)
MCV RBC AUTO: 92.7 FL (ref 79–97)
PLATELET # BLD AUTO: 377 10*3/MM3 (ref 140–450)
PMV BLD AUTO: 9.3 FL (ref 6–12)
POTASSIUM SERPL-SCNC: 5 MMOL/L (ref 3.5–5.2)
RBC # BLD AUTO: 4.66 10*6/MM3 (ref 4.14–5.8)
SODIUM SERPL-SCNC: 140 MMOL/L (ref 136–145)
WBC NRBC COR # BLD: 8.91 10*3/MM3 (ref 3.4–10.8)

## 2023-06-15 PROCEDURE — 36415 COLL VENOUS BLD VENIPUNCTURE: CPT

## 2023-06-15 PROCEDURE — 80048 BASIC METABOLIC PNL TOTAL CA: CPT

## 2023-06-15 PROCEDURE — 85027 COMPLETE CBC AUTOMATED: CPT

## 2023-06-15 NOTE — DISCHARGE INSTRUCTIONS
Take the following medications the morning of surgery: GABAPENTIN    ARRIVAL TIME WILL BE CALLED TO YOU THE DAY PRIOR TO SURGERY      If you are on prescription narcotic pain medication to control your pain you may also take that medication the morning of surgery.    General Instructions:  Do not eat solid food after midnight the night before surgery.  You may drink clear liquids day of surgery but must stop at least one hour before your hospital arrival time.  It is beneficial for you to have a clear drink that contains carbohydrates the day of surgery.  We suggest a 12 to 20 ounce bottle of Gatorade or Powerade for non-diabetic patients or a 12 to 20 ounce bottle of G2 or Powerade Zero for diabetic patients. (Pediatric patients, are not advised to drink a 12 to 20 ounce carbohydrate drink)    Clear liquids are liquids you can see through.  Nothing red in color.     Plain water                               Sports drinks  Sodas                                   Gelatin (Jell-O)  Fruit juices without pulp such as white grape juice and apple juice  Popsicles that contain no fruit or yogurt  Tea or coffee (no cream or milk added)  Gatorade / Powerade  G2 / Powerade Zero    Patients who avoid smoking, chewing tobacco and alcohol for 4 weeks prior to surgery have a reduced risk of post-operative complications.  Quit smoking as many days before surgery as you can.  Do not smoke, use chewing tobacco or drink alcohol the day of surgery.   If applicable bring your C-PAP/ BI-PAP machine in with you to preop day of surgery.  Bring any papers given to you in the doctor’s office.  Wear clean comfortable clothes.  Do not wear contact lenses, false eyelashes or make-up.  Bring a case for your glasses.   Bring crutches or walker if applicable.  Remove all piercings.  Leave jewelry and any other valuables at home.  Hair extensions with metal clips must be removed prior to surgery.  The Pre-Admission Testing nurse will instruct you  to bring medications if unable to obtain an accurate list in Pre-Admission Testing.        Preventing a Surgical Site Infection:  For 2 to 3 days before surgery, avoid shaving with a razor because the razor can irritate skin and make it easier to develop an infection.    Any areas of open skin can increase the risk of a post-operative wound infection by allowing bacteria to enter and travel throughout the body.  Notify your surgeon if you have any skin wounds / rashes even if it is not near the expected surgical site.  The area will need assessed to determine if surgery should be delayed until it is healed.  The night prior to surgery shower using a fresh bar of anti-bacterial soap (such as Dial) and clean washcloth.  Sleep in a clean bed with clean clothing.  Do not allow pets to sleep with you.  Shower on the morning of surgery using a fresh bar of anti-bacterial soap (such as Dial) and clean washcloth.  Dry with a clean towel and dress in clean clothing.  Ask your surgeon if you will be receiving antibiotics prior to surgery.  Make sure you, your family, and all healthcare providers clean their hands with soap and water or an alcohol based hand  before caring for you or your wound.    Day of surgery:  Your arrival time is approximately two hours before your scheduled surgery time.  Upon arrival, a Pre-op nurse and Anesthesiologist will review your health history, obtain vital signs, and answer questions you may have.  The only belongings needed at this time will be a list of your home medications and if applicable your C-PAP/BI-PAP machine.  A Pre-op nurse will start an IV and you may receive medication in preparation for surgery, including something to help you relax.     Please be aware that surgery does come with discomfort.  We want to make every effort to control your discomfort so please discuss any uncontrolled symptoms with your nurse.   Your doctor will most likely have prescribed pain  medications.      If you are going home after surgery you will receive individualized written care instructions before being discharged.  A responsible adult must drive you to and from the hospital on the day of your surgery and stay with you for 24 hours.  Discharge prescriptions can be filled by the hospital pharmacy during regular pharmacy hours.  If you are having surgery late in the day/evening your prescription may be e-prescribed to your pharmacy.  Please verify your pharmacy hours or chose a 24 hour pharmacy to avoid not having access to your prescription because your pharmacy has closed for the day.    If you are staying overnight following surgery, you will be transported to your hospital room following the recovery period.  [facility] has all private rooms.    If you have any questions please call Pre-Admission Testing at [phone].  Deductibles and co-payments are collected on the day of service. Please be prepared to pay the required co-pay, deductible or deposit on the day of service as defined by your plan.    Call your surgeon immediately if you experience any of the following symptoms:  Sore Throat  Shortness of Breath or difficulty breathing  Cough  Chills  Body soreness or muscle pain  Headache  Fever  New loss of taste or smell  Do not arrive for your surgery ill.  Your procedure will need to be rescheduled to another time.  You will need to call your physician before the day of surgery to avoid any unnecessary exposure to hospital staff as well as other patients.

## 2023-07-20 ENCOUNTER — OFFICE VISIT (OUTPATIENT)
Dept: ORTHOPEDIC SURGERY | Facility: CLINIC | Age: 43
End: 2023-07-20
Payer: COMMERCIAL

## 2023-07-20 VITALS — BODY MASS INDEX: 22.66 KG/M2 | HEIGHT: 66 IN | WEIGHT: 141 LBS

## 2023-07-20 DIAGNOSIS — R20.2 NUMBNESS AND TINGLING IN RIGHT HAND: Primary | ICD-10-CM

## 2023-07-20 DIAGNOSIS — Z09 SURGERY FOLLOW-UP: ICD-10-CM

## 2023-07-20 DIAGNOSIS — R20.0 NUMBNESS AND TINGLING IN RIGHT HAND: Primary | ICD-10-CM

## 2023-07-20 RX ORDER — PREDNISONE 20 MG/1
20 TABLET ORAL DAILY
Qty: 5 TABLET | Refills: 0 | Status: SHIPPED | OUTPATIENT
Start: 2023-07-20

## 2023-07-24 NOTE — PROGRESS NOTES
Sandeep Mcdonald : 1980 MRN: [id number] DATE: 2023    CC:  Right hand numbness, 3 weeks s/p right shoulder arthroscopy    HPI: Patient returns to clinic today for follow up.  He comes in today for evaluation of right hand numbness which began 5 days ago.  Reports he was out of the sling and carrying a small bag of groceries in his left hand.  He transferred the bag without thinking to the right hand to close a door.  He quickly realized his mistake and switched the bag back to the left hand.  He has been experiencing some right hand numbness since the incident.  He reports some loss of sensation along in inner aspect of the forearm.  He has intermittent, brief pain in the axilla as well.    Exam:  Wounds appear healed.  Shoulder moves fluidly and his motion is on track.  Contour of the biceps appears normal.  Good motor function in the hand and wrist.  Subjective loss of sensation anterior forearm and into the hand along the median nerve distribution.  Palpable radial pulse with brisk capillary refill.    Impression:   1.  Right hand numbness, suspected median nerve irritation  2.  3 weeks s/p right shoulder arthroscopic biceps tenodesis, labral debridement    Plan:   It appears he has some irritation along the median nerve.  I explained nerves regenerate very slowly once they are irritated.  I have given him a prescription for prednisone.  The risk were discussed.  I encouraged him to continue to monitor his symptoms closely.  I encouraged him to inform me if his symptoms persist or worsen.   Based on exam, everything seems as expected in regards to his surgery.  His motion is good and seems to be on track.  Patient will follow-up with Dr. Lindsey next week as previously scheduled.  I encouraged him to call with any questions or concerns prior to that appointment.  Counseled the patient about activity modifications and restrictions.      Esha Dan, SAMMY     2023

## 2023-07-28 ENCOUNTER — OFFICE VISIT (OUTPATIENT)
Dept: ORTHOPEDIC SURGERY | Facility: CLINIC | Age: 43
End: 2023-07-28
Payer: COMMERCIAL

## 2023-07-28 VITALS — TEMPERATURE: 97.7 F | BODY MASS INDEX: 22.32 KG/M2 | WEIGHT: 138.9 LBS | HEIGHT: 66 IN

## 2023-07-28 DIAGNOSIS — Z09 SURGERY FOLLOW-UP: Primary | ICD-10-CM

## 2023-07-28 RX ORDER — MELOXICAM 15 MG/1
1 TABLET ORAL DAILY
COMMUNITY
Start: 2023-07-17

## 2023-07-28 NOTE — PROGRESS NOTES
Sandeep Mcdonald : 1980 MRN: [id number] DATE: 2023    CC: 1 month s/p right shoulder arthroscopy    HPI: Patient returns to clinic today for follow up.  He had an incident with some increased pain and numbness in the hand a few weeks ago.  Those symptoms are resolving but not resolved.  At least 50% better at this point.  He still has some intermittent numbness and tingling in his thumb, index and middle fingers.  Overall, he reports good progress with therapy.      Vitals:    23 1038   Temp: 97.7 °F (36.5 °C)       Exam:  Wounds appear healed.  Shoulder moves fluidly and his motion actually seems to be a little ahead of schedule.  He has near full motion in all planes.  Contour of the biceps looks perfect.  At the wrist and hand, no atrophy.  Mild tenderness over the carpal tunnel.  He does have some diminished sensation in the thumb and index fingertips but he has good  and pinch strength.    Impression:  1 month s/p right shoulder arthroscopic biceps tenodesis, labral debridment with suspected early carpal tunnel syndrome    Plan:    1.  Continue PT per protocol--encouraged him to progress motion as tolerated.  2.  OK to D/C sling.  OK to begin driving.  3.  Follow up in 6 weeks  4.  Counseled him about appropriate activity modifications and restrictions.  5.  I fitted him for a cock up wrist splint for the carpal tunnel.  He was instructed on its use.      Tommie Lindsey MD

## 2023-08-03 RX ORDER — CYCLOBENZAPRINE HCL 10 MG
10 TABLET ORAL NIGHTLY PRN
Qty: 30 TABLET | Refills: 0 | Status: SHIPPED | OUTPATIENT
Start: 2023-08-03

## 2023-09-08 ENCOUNTER — OFFICE VISIT (OUTPATIENT)
Dept: ORTHOPEDIC SURGERY | Facility: CLINIC | Age: 43
End: 2023-09-08
Payer: COMMERCIAL

## 2023-09-08 VITALS — TEMPERATURE: 97.8 F | HEIGHT: 66 IN | BODY MASS INDEX: 22.73 KG/M2 | WEIGHT: 141.4 LBS

## 2023-09-08 DIAGNOSIS — Z09 SURGERY FOLLOW-UP: Primary | ICD-10-CM

## 2023-09-08 NOTE — PROGRESS NOTES
Sandeep Mcdonald : 1980 MRN: [id number] DATE: 2023    CC: 2.5 months s/p right shoulder arthroscopy    HPI: Patient returns to clinic today for follow up.  Reports pain is nearly resolved.  Reports that therapy is going well.  Denies any problems or concerns.      Vitals:    23 1100   Temp: 97.8 °F (36.6 °C)       Exam:  Wounds are healed.  Shoulder motion is near full in all planes.  Still some residual weakness with elevation and abduction.  Good motor and sensory function in the hand and wrist.  Palpable radial pulse with brisk capillary refill.    Impression:  2.5 months s/p right shoulder arthroscopic biceps tenodesis, labral debridement    Plan:    1.  Continue PT per protocol--progress strengthening as tolerated  2.  OK to gradually progress activity--appropriate restrictions discussed.  3.  Follow up for final visit in 6 weeks.    Tommie Lindsey MD

## 2023-10-27 ENCOUNTER — OFFICE VISIT (OUTPATIENT)
Dept: ORTHOPEDIC SURGERY | Facility: CLINIC | Age: 43
End: 2023-10-27
Payer: COMMERCIAL

## 2023-10-27 VITALS — TEMPERATURE: 98.6 F | WEIGHT: 145.4 LBS | BODY MASS INDEX: 23.37 KG/M2 | HEIGHT: 66 IN

## 2023-10-27 DIAGNOSIS — Z09 SURGERY FOLLOW-UP: Primary | ICD-10-CM

## 2023-10-27 RX ORDER — CETIRIZINE HYDROCHLORIDE 10 MG/1
TABLET ORAL EVERY 24 HOURS
COMMUNITY
Start: 2023-10-23

## 2023-10-27 RX ORDER — DEXTROAMPHETAMINE SACCHARATE, AMPHETAMINE ASPARTATE, DEXTROAMPHETAMINE SULFATE AND AMPHETAMINE SULFATE 2.5; 2.5; 2.5; 2.5 MG/1; MG/1; MG/1; MG/1
TABLET ORAL
COMMUNITY
Start: 2023-10-16

## 2023-10-27 RX ORDER — FLUTICASONE PROPIONATE 50 UG/1
SPRAY, METERED NASAL EVERY 24 HOURS
COMMUNITY
Start: 2023-10-23

## 2023-10-27 NOTE — PROGRESS NOTES
Sandeep Mcdonald : 1980 MRN: [id number] DATE: 10/27/2023    CC: Follow-up s/p right shoulder arthroscopy    HPI: Pt. returns to clinic today for follow up.  He is 4 months out from his shoulder scope.  He says his preoperative symptoms are much better.  He is happy with the shoulder overall and has been released from therapy.  His only complaint is muscular tightness.  He feels like he could benefit from dry needling in PT.  Unfortunately he has run out of his insurance allotted therapy visit.    Vitals:    10/27/23 0847   Temp: 98.6 °F (37 °C)     Exam:    General:  Awake and alert.  No acute distress.    Extremities:  Wounds are healed.  Arm and forearm soft.  Motion is full.  Good strength with resistive testing of the rotator cuff and deltoid.  Negative Neer, Cardenas, speeds, Garza's and active compression maneuvers.  Good motor and sensory function in the hand and wrist.  Palpable radial pulse.    Imaging   none    Impression: Follow-up now approximately 4 months s/p right shoulder biceps tenodesis, labral debridement    Plan:    I have referred him back to PT for dry needling.  Hopefully he can get that approved through his insurance.  Overall, my impression is that He seems to be doing well.  I am fine with him progressing his activity as tolerated at this point.  I did explain that full resolution of symptoms may take up to a year.  Going forward he can follow-up with me as needed.     Tommie Lindsey MD    10/27/2023

## 2023-11-14 ENCOUNTER — OFFICE VISIT (OUTPATIENT)
Dept: FAMILY MEDICINE CLINIC | Facility: CLINIC | Age: 43
End: 2023-11-14
Payer: COMMERCIAL

## 2023-11-14 VITALS
DIASTOLIC BLOOD PRESSURE: 70 MMHG | BODY MASS INDEX: 22.66 KG/M2 | HEART RATE: 86 BPM | TEMPERATURE: 97.6 F | SYSTOLIC BLOOD PRESSURE: 125 MMHG | HEIGHT: 66 IN | OXYGEN SATURATION: 98 % | WEIGHT: 141 LBS

## 2023-11-14 DIAGNOSIS — J30.89 PERENNIAL ALLERGIC RHINITIS: Primary | ICD-10-CM

## 2023-11-14 DIAGNOSIS — Z00.00 ANNUAL PHYSICAL EXAM: ICD-10-CM

## 2023-11-14 DIAGNOSIS — M25.819 SHOULDER IMPINGEMENT: ICD-10-CM

## 2023-11-14 LAB
ALBUMIN SERPL-MCNC: 4.8 G/DL (ref 3.5–5.2)
ALBUMIN/GLOB SERPL: 2.2 G/DL
ALP SERPL-CCNC: 55 U/L (ref 39–117)
ALT SERPL W P-5'-P-CCNC: 17 U/L (ref 1–41)
ANION GAP SERPL CALCULATED.3IONS-SCNC: 8.2 MMOL/L (ref 5–15)
AST SERPL-CCNC: 16 U/L (ref 1–40)
BASOPHILS # BLD AUTO: 0.1 10*3/MM3 (ref 0–0.2)
BASOPHILS NFR BLD AUTO: 1.1 % (ref 0–1.5)
BILIRUB SERPL-MCNC: 0.9 MG/DL (ref 0–1.2)
BUN SERPL-MCNC: 16 MG/DL (ref 6–20)
BUN/CREAT SERPL: 18.4 (ref 7–25)
CALCIUM SPEC-SCNC: 9.6 MG/DL (ref 8.6–10.5)
CHLORIDE SERPL-SCNC: 103 MMOL/L (ref 98–107)
CHOLEST SERPL-MCNC: 147 MG/DL (ref 0–200)
CO2 SERPL-SCNC: 26.8 MMOL/L (ref 22–29)
CREAT SERPL-MCNC: 0.87 MG/DL (ref 0.76–1.27)
DEPRECATED RDW RBC AUTO: 38.5 FL (ref 37–54)
EGFRCR SERPLBLD CKD-EPI 2021: 110.5 ML/MIN/1.73
EOSINOPHIL # BLD AUTO: 0.15 10*3/MM3 (ref 0–0.4)
EOSINOPHIL NFR BLD AUTO: 1.6 % (ref 0.3–6.2)
ERYTHROCYTE [DISTWIDTH] IN BLOOD BY AUTOMATED COUNT: 11.6 % (ref 12.3–15.4)
GLOBULIN UR ELPH-MCNC: 2.2 GM/DL
GLUCOSE SERPL-MCNC: 90 MG/DL (ref 65–99)
HCT VFR BLD AUTO: 39.1 % (ref 37.5–51)
HDLC SERPL-MCNC: 47 MG/DL (ref 40–60)
HGB BLD-MCNC: 13.3 G/DL (ref 13–17.7)
IMM GRANULOCYTES # BLD AUTO: 0.02 10*3/MM3 (ref 0–0.05)
IMM GRANULOCYTES NFR BLD AUTO: 0.2 % (ref 0–0.5)
LDLC SERPL CALC-MCNC: 89 MG/DL (ref 0–100)
LDLC/HDLC SERPL: 1.9 {RATIO}
LYMPHOCYTES # BLD AUTO: 2.93 10*3/MM3 (ref 0.7–3.1)
LYMPHOCYTES NFR BLD AUTO: 31.8 % (ref 19.6–45.3)
MCH RBC QN AUTO: 31.3 PG (ref 26.6–33)
MCHC RBC AUTO-ENTMCNC: 34 G/DL (ref 31.5–35.7)
MCV RBC AUTO: 92 FL (ref 79–97)
MONOCYTES # BLD AUTO: 0.54 10*3/MM3 (ref 0.1–0.9)
MONOCYTES NFR BLD AUTO: 5.9 % (ref 5–12)
NEUTROPHILS NFR BLD AUTO: 5.47 10*3/MM3 (ref 1.7–7)
NEUTROPHILS NFR BLD AUTO: 59.4 % (ref 42.7–76)
NRBC BLD AUTO-RTO: 0 /100 WBC (ref 0–0.2)
PLATELET # BLD AUTO: 380 10*3/MM3 (ref 140–450)
PMV BLD AUTO: 9.8 FL (ref 6–12)
POTASSIUM SERPL-SCNC: 3.9 MMOL/L (ref 3.5–5.2)
PROT SERPL-MCNC: 7 G/DL (ref 6–8.5)
RBC # BLD AUTO: 4.25 10*6/MM3 (ref 4.14–5.8)
SODIUM SERPL-SCNC: 138 MMOL/L (ref 136–145)
TRIGL SERPL-MCNC: 53 MG/DL (ref 0–150)
TSH SERPL DL<=0.05 MIU/L-ACNC: 3.02 UIU/ML (ref 0.27–4.2)
VLDLC SERPL-MCNC: 11 MG/DL (ref 5–40)
WBC NRBC COR # BLD: 9.21 10*3/MM3 (ref 3.4–10.8)

## 2023-11-14 PROCEDURE — 80061 LIPID PANEL: CPT | Performed by: FAMILY MEDICINE

## 2023-11-14 PROCEDURE — 80050 GENERAL HEALTH PANEL: CPT | Performed by: FAMILY MEDICINE

## 2023-11-14 RX ORDER — FLUTICASONE PROPIONATE 50 UG/1
SPRAY, METERED NASAL EVERY 24 HOURS
Status: CANCELLED | OUTPATIENT
Start: 2023-11-14

## 2023-11-14 NOTE — ASSESSMENT & PLAN NOTE
Overall he is not doing bad except for his allergies.  He states they have become much worse this year.  At times there is somewhat debilitating with his symptoms.  We will have him see the allergist to be evaluated further for that.

## 2023-11-14 NOTE — ASSESSMENT & PLAN NOTE
His allergies have become much worse.  Some of this may be related to anything in his apartment since are much worse or other types of allergens as well.  We will have him see the allergist to become allergy tested.

## 2023-11-14 NOTE — PROGRESS NOTES
Chief Complaint   Patient presents with    Annual Exam        Subjective     Sandeep Mcdonald  has a past medical history of Anxiety, Back pain, Shoulder pain, right, Testicular pain, Urinary incontinence, and Urinary retention.    Annual exam-overall he is doing well except for his right shoulder.  He had an arthroscopic surgery with bicep tendon Deasis and a right labral repair.  He is still working on that.  On the last orthopedic note it did note that his range of motion was near normal although still with some residual weakness and pain.    Allergies-he has persistent allergies.  He states ever since he moved in a new apartment he notices allergies are worse when he is home.  He states that he has severe head congestion headache and irritated eyes and coughing.  He will oftentimes have just persistent sneezing.  He states that he felt that they had had water damage in the attic above him.  They are trying to evaluate that and seal it off from his apartment.  Currently he is using his Zyrtec and Flonase every day.        PHQ-2 Depression Screening  Little interest or pleasure in doing things?     Feeling down, depressed, or hopeless?     PHQ-2 Total Score     PHQ-9 Depression Screening  Little interest or pleasure in doing things?     Feeling down, depressed, or hopeless?     Trouble falling or staying asleep, or sleeping too much?     Feeling tired or having little energy?     Poor appetite or overeating?     Feeling bad about yourself - or that you are a failure or have let yourself or your family down?     Trouble concentrating on things, such as reading the newspaper or watching television?     Moving or speaking so slowly that other people could have noticed? Or the opposite - being so fidgety or restless that you have been moving around a lot more than usual?     Thoughts that you would be better off dead, or of hurting yourself in some way?     PHQ-9 Total Score     If you checked off any problems, how  difficult have these problems made it for you to do your work, take care of things at home, or get along with other people?       No Known Allergies    Prior to Admission medications    Medication Sig Start Date End Date Taking? Authorizing Provider   amphetamine-dextroamphetamine (ADDERALL) 10 MG tablet TAKE 1 TABLET BY MOUTH TWICE DAILY FOR ADHD 10/16/23  Yes Thao Monterroso MD   cetirizine (zyrTEC) 10 MG tablet Daily. 10/23/23  Yes ProviderThao MD   Flonase Allergy Relief 50 MCG/ACT nasal spray Daily. 10/23/23  Yes ProviderThao MD   gabapentin (NEURONTIN) 300 MG capsule Take 1 capsule by mouth 5 (Five) Times a Day. 11/25/22  Yes Thao Monterroso MD   meloxicam (MOBIC) 15 MG tablet Take 1 tablet by mouth Daily. 7/17/23  Yes Thao Monterroso MD   acetaminophen (TYLENOL) 325 MG tablet Take 2 tablets by mouth 2 (Two) Times a Day As Needed for Mild Pain.  Patient not taking: Reported on 11/14/2023 6/29/23 11/14/23  Tommie Linsdey MD        Patient Active Problem List   Diagnosis    Arthritis    Gastritis    Hemorrhoids    Paresthesia of lower extremity    Perennial allergic rhinitis    Shoulder impingement    Shoulder pain    Ulnar neuropathy    Rash    Bloating    Diarrhea    Nausea    Right upper quadrant abdominal pain    Annual physical exam        Past Surgical History:   Procedure Laterality Date    ANKLE SURGERY Left     SHOULDER ARTHROSCOPY W/ SUPERIOR LABRAL ANTERIOR POSTERIOR REPAIR Right 6/29/2023    Procedure: SHOULDER ARTHROSCOPY WITH BICEP TENODESIS AND LABRAL DEBRIDEMENT;  Surgeon: Tommie Lindsey MD;  Location: [facility];  Service: Orthopedics;  Laterality: Right;    SHOULDER SURGERY Bilateral     ULNAR NERVE TRANSPOSITION Left        Social History     Socioeconomic History    Marital status: Single   Tobacco Use    Smoking status: Never     Passive exposure: Never    Smokeless tobacco: Never   Vaping Use    Vaping Use: Never used   Substance and Sexual  "Activity    Alcohol use: Not Currently    Drug use: Never    Sexual activity: Defer       Family History   Problem Relation Age of Onset    No Known Problems Mother     No Known Problems Father     No Known Problems Maternal Grandmother     No Known Problems Maternal Grandfather     No Known Problems Paternal Grandmother     No Known Problems Paternal Grandfather     Malig Hyperthermia Neg Hx        Family history, surgical history, past medical history, Allergies and meds reviewed with patient today and updated in [facility] EMR.     ROS:  Review of Systems   Constitutional:  Negative for chills and fatigue.   HENT:  Positive for congestion and sneezing. Negative for postnasal drip and rhinorrhea.    Respiratory:  Positive for cough. Negative for chest tightness, shortness of breath and wheezing.    Musculoskeletal:  Positive for arthralgias.   Neurological:  Positive for headache.       OBJECTIVE:  Vitals:    11/14/23 1550   BP: 125/70   BP Location: Left arm   Patient Position: Sitting   Pulse: 86   Temp: 97.6 °F (36.4 °C)   SpO2: 98%   Weight: 64 kg (141 lb)   Height: 167.6 cm (66\")     No results found.   Body mass index is 22.76 kg/m².  No LMP for male patient.    Physical Exam  Vitals and nursing note reviewed.   Constitutional:       General: He is not in acute distress.     Appearance: Normal appearance. He is normal weight.   HENT:      Head: Normocephalic.      Right Ear: Tympanic membrane, ear canal and external ear normal.      Left Ear: Tympanic membrane, ear canal and external ear normal.      Nose: Nose normal.      Mouth/Throat:      Mouth: Mucous membranes are moist.      Pharynx: Oropharynx is clear.   Eyes:      General: No scleral icterus.     Conjunctiva/sclera: Conjunctivae normal.      Pupils: Pupils are equal, round, and reactive to light.   Cardiovascular:      Rate and Rhythm: Normal rate and regular rhythm.      Pulses: Normal pulses.      Heart sounds: Normal heart sounds. No murmur " heard.  Pulmonary:      Effort: Pulmonary effort is normal.      Breath sounds: Normal breath sounds. No wheezing, rhonchi or rales.   Musculoskeletal:      Cervical back: Neck supple. No rigidity or tenderness.   Lymphadenopathy:      Cervical: No cervical adenopathy.   Skin:     General: Skin is warm and dry.      Coloration: Skin is not jaundiced.      Findings: No rash.   Neurological:      General: No focal deficit present.      Mental Status: He is alert and oriented to person, place, and time.   Psychiatric:         Mood and Affect: Mood normal.         Thought Content: Thought content normal.         Judgment: Judgment normal.         Procedures    No visits with results within 30 Day(s) from this visit.   Latest known visit with results is:   Pre-Admission Testing on 06/15/2023   Component Date Value Ref Range Status    Glucose 06/15/2023 104 (H)  65 - 99 mg/dL Final    BUN 06/15/2023 18  6 - 20 mg/dL Final    Creatinine 06/15/2023 0.92  0.76 - 1.27 mg/dL Final    Sodium 06/15/2023 140  136 - 145 mmol/L Final    Potassium 06/15/2023 5.0  3.5 - 5.2 mmol/L Final    Chloride 06/15/2023 103  98 - 107 mmol/L Final    CO2 06/15/2023 30.0 (H)  22.0 - 29.0 mmol/L Final    Calcium 06/15/2023 9.2  8.6 - 10.5 mg/dL Final    BUN/Creatinine Ratio 06/15/2023 19.6  7.0 - 25.0 Final    Anion Gap 06/15/2023 7.0  5.0 - 15.0 mmol/L Final    eGFR 06/15/2023 107.2  >60.0 mL/min/1.73 Final    WBC 06/15/2023 8.91  3.40 - 10.80 10*3/mm3 Final    RBC 06/15/2023 4.66  4.14 - 5.80 10*6/mm3 Final    Hemoglobin 06/15/2023 14.5  13.0 - 17.7 g/dL Final    Hematocrit 06/15/2023 43.2  37.5 - 51.0 % Final    MCV 06/15/2023 92.7  79.0 - 97.0 fL Final    MCH 06/15/2023 31.1  26.6 - 33.0 pg Final    MCHC 06/15/2023 33.6  31.5 - 35.7 g/dL Final    RDW 06/15/2023 11.8 (L)  12.3 - 15.4 % Final    RDW-SD 06/15/2023 40.2  37.0 - 54.0 fl Final    MPV 06/15/2023 9.3  6.0 - 12.0 fL Final    Platelets 06/15/2023 377  140 - 450 10*3/mm3 Final        ASSESSMENT/ PLAN:    Diagnoses and all orders for this visit:    1. Perennial allergic rhinitis (Primary)  Assessment & Plan:  His allergies have become much worse.  Some of this may be related to anything in his apartment since are much worse or other types of allergens as well.  We will have him see the allergist to become allergy tested.    Orders:  -     Ambulatory Referral to Allergy    2. Annual physical exam  Assessment & Plan:  Overall he is not doing bad except for his allergies.  He states they have become much worse this year.  At times there is somewhat debilitating with his symptoms.  We will have him see the allergist to be evaluated further for that.    Orders:  -     Comprehensive Metabolic Panel  -     CBC & Differential  -     TSH  -     Lipid Panel    3. Shoulder impingement  Assessment & Plan:  He will follow-up with his orthopedist on a regular basis to discuss his ongoing shoulder issues.          BMI is within normal parameters. No other follow-up for BMI required.      Orders Placed Today:     No orders of the defined types were placed in this encounter.       Management Plan:     An After Visit Summary was printed and given to the patient at discharge.    Follow-up: No follow-ups on file.    Nikunj Manning DO 11/14/2023 16:30 EST  This note was electronically signed.

## 2023-11-27 ENCOUNTER — OFFICE VISIT (OUTPATIENT)
Dept: ORTHOPEDIC SURGERY | Facility: CLINIC | Age: 43
End: 2023-11-27
Payer: COMMERCIAL

## 2023-11-27 VITALS — WEIGHT: 140.3 LBS | TEMPERATURE: 98.6 F | BODY MASS INDEX: 22.55 KG/M2 | HEIGHT: 66 IN

## 2023-11-27 DIAGNOSIS — M25.522 LEFT ELBOW PAIN: Primary | ICD-10-CM

## 2023-11-27 PROCEDURE — 20605 DRAIN/INJ JOINT/BURSA W/O US: CPT | Performed by: ORTHOPAEDIC SURGERY

## 2023-11-27 PROCEDURE — 73070 X-RAY EXAM OF ELBOW: CPT | Performed by: ORTHOPAEDIC SURGERY

## 2023-11-27 PROCEDURE — 99213 OFFICE O/P EST LOW 20 MIN: CPT | Performed by: ORTHOPAEDIC SURGERY

## 2023-11-27 RX ORDER — METHYLPREDNISOLONE ACETATE 80 MG/ML
80 INJECTION, SUSPENSION INTRA-ARTICULAR; INTRALESIONAL; INTRAMUSCULAR; SOFT TISSUE
Status: COMPLETED | OUTPATIENT
Start: 2023-11-27 | End: 2023-11-27

## 2023-11-27 RX ORDER — LIDOCAINE HYDROCHLORIDE 10 MG/ML
2 INJECTION, SOLUTION EPIDURAL; INFILTRATION; INTRACAUDAL; PERINEURAL
Status: COMPLETED | OUTPATIENT
Start: 2023-11-27 | End: 2023-11-27

## 2023-11-27 RX ORDER — ACETAMINOPHEN 500 MG
500 TABLET ORAL EVERY 6 HOURS PRN
COMMUNITY

## 2023-11-27 RX ORDER — IBUPROFEN 200 MG
200 TABLET ORAL EVERY 6 HOURS PRN
COMMUNITY

## 2023-11-27 RX ADMIN — METHYLPREDNISOLONE ACETATE 80 MG: 80 INJECTION, SUSPENSION INTRA-ARTICULAR; INTRALESIONAL; INTRAMUSCULAR; SOFT TISSUE at 11:53

## 2023-11-27 RX ADMIN — LIDOCAINE HYDROCHLORIDE 2 ML: 10 INJECTION, SOLUTION EPIDURAL; INFILTRATION; INTRACAUDAL; PERINEURAL at 11:53

## 2023-11-27 NOTE — PROGRESS NOTES
Patient:Sandeep Mcdonald    YOB: 1980    Medical Record Number:[id number]    Chief Complaints: Left elbow pain    History of Present Illness:     43 y.o. male patient who presents for a new complaint of left elbow pain.  He reports a history of previous problems with his left elbow for which he underwent an ulnar nerve transposition in 2016.  Due to his right shoulder surgery, he has been overusing his left arm.  He has also recently been playing a lot more guitar and taken off a workout program.  He feels like it is the combination of all of these factors that have resulted in increased elbow pain over the past few months.  Most of his pain is lateral radiating into the upper forearm.  Denies any numbness or tingling.  The pain is worse with certain reaching and lifting movements.  He has not noticed any alleviating factors other than rest.    Allergies:No Known Allergies      Current Outpatient Medications:   •  amphetamine-dextroamphetamine (ADDERALL) 10 MG tablet, TAKE 1 TABLET BY MOUTH TWICE DAILY FOR ADHD, Disp: , Rfl:   •  cetirizine (zyrTEC) 10 MG tablet, Daily., Disp: , Rfl:   •  Flonase Allergy Relief 50 MCG/ACT nasal spray, Daily., Disp: , Rfl:   •  gabapentin (NEURONTIN) 300 MG capsule, Take 1 capsule by mouth 5 (Five) Times a Day., Disp: , Rfl:   •  meloxicam (MOBIC) 15 MG tablet, Take 1 tablet by mouth Daily., Disp: , Rfl:     Past Medical History:   Diagnosis Date   • Anxiety    • Back pain    • Shoulder pain, right    • Testicular pain    • Urinary incontinence    • Urinary retention     FOLLOWED BY UROLOGY       Past Surgical History:   Procedure Laterality Date   • ANKLE SURGERY Left    • SHOULDER ARTHROSCOPY W/ SUPERIOR LABRAL ANTERIOR POSTERIOR REPAIR Right 6/29/2023    Procedure: SHOULDER ARTHROSCOPY WITH BICEP TENODESIS AND LABRAL DEBRIDEMENT;  Surgeon: Tommie Lindsey MD;  Location: [facility] OR [facility] [place];  Service: Orthopedics;  Laterality: Right;   • SHOULDER SURGERY Bilateral    •  "ULNAR NERVE TRANSPOSITION Left      Social History     Occupational History   • Not on file   Tobacco Use   • Smoking status: Never     Passive exposure: Never   • Smokeless tobacco: Never   Vaping Use   • Vaping Use: Never used   Substance and Sexual Activity   • Alcohol use: Not Currently   • Drug use: Never   • Sexual activity: Defer      Social History     Social History Narrative   • Not on file       Family History   Problem Relation Age of Onset   • No Known Problems Mother    • No Known Problems Father    • No Known Problems Maternal Grandmother    • No Known Problems Maternal Grandfather    • No Known Problems Paternal Grandmother    • No Known Problems Paternal Grandfather    • Malig Hyperthermia Neg Hx        Review of Systems:      Constitutional: Denies fever, shaking or chills   Eyes: Denies change in visual acuity   HEENT: Denies nasal congestion or sore throat   Respiratory: Denies cough or shortness of breath   Cardiovascular: Denies chest pain or edema  Endocrine: Denies tremors, palpitations, intolerance of heat or cold, polyuria, polydipsia.  GI: Denies abdominal pain, nausea, vomiting, bloody stools or diarrhea  : Denies frequency, urgency, incontinence, retention, or nocturia.  Musculoskeletal: Denies numbness, tingling or loss of motor function except as above  Integument: Denies rash, lesion or ulceration   Neurologic: Denies headache or focal weakness, deficits  Heme: Denies spontaneous or excessive bleeding, epistaxis, hematuria, melena, fatigue, enlarged or tender lymph nodes.      All other pertinent positives and negatives as noted above in HPI.    Physical Exam:43 y.o. male  Vitals:    11/27/23 1130   Temp: 98.6 °F (37 °C)   Weight: 63.6 kg (140 lb 4.8 oz)   Height: 167.6 cm (66\")     General:  Patient is awake and alert.  Appears in no acute distress or discomfort.    Psych:  Affect and demeanor are appropriate.    Extremities:  Left elbow is examined.  Skin is benign.  No gross " abnormalities on inspection.  No atrophy, swelling, or masses.  Focal tenderness at the lateral epicondyle.  Mild tenderness over the radial tunnel as well.  Elbow motion is full.  No instability.  Good strength with elbow flexion, extension, forearm pronation and supination, wrist flexion and extension, and .  He has significant pain over the lateral epicondyle with resisted wrist extension.  Sensation is intact.  Brisk capillary refill.  Palpable radial pulse.  Good skin turgor.     Imaging:  AP and lateral views of the left elbow are ordered by myself and reviewed to evaluate the patient's complaint.  No comparison films are immediately available.  The x-rays show no obvious acute abnormalities, lesions, masses, significant degenerative changes, or other concerning findings.     Assessment/Plan:  Left lateral epicondylitis, mild associated radial tunnel syndrome    We discussed his options.  We discussed conservative treatment in detail including activity modifications, anti-inflammatories, both cortisone and PRP injection and physical therapy.  He is potentially interested in PRP but the cost is a major consideration.  He elected to try cortisone injection first and then if that fails, he would like to try the PT.  The risk, benefits and alternatives to the injection were discussed.  He consented and injection was performed as described below.  I told him to give it about 2 weeks and then contact me if no better.    Tommie Lindsey MD    11/27/2023        Medium Joint Arthrocentesis: L elbow  Date/Time: 11/27/2023 11:53 AM  Consent given by: patient  Site marked: site marked  Timeout: Immediately prior to procedure a time out was called to verify the correct patient, procedure, equipment, support staff and site/side marked as required   Supporting Documentation  Indications: pain   Procedure Details  Location: elbow - L elbow  Preparation: Patient was prepped and draped in the usual sterile fashion  Needle  size: 25 G  Approach: anterolateral  Medications administered: 2 mL lidocaine PF 1% 1 %; 80 mg methylPREDNISolone acetate 80 MG/ML  Patient tolerance: patient tolerated the procedure well with no immediate complications

## 2024-03-06 ENCOUNTER — OFFICE VISIT (OUTPATIENT)
Dept: ORTHOPEDIC SURGERY | Facility: CLINIC | Age: 44
End: 2024-03-06
Payer: COMMERCIAL

## 2024-03-06 VITALS — WEIGHT: 140.3 LBS | HEIGHT: 66 IN | TEMPERATURE: 98.6 F | BODY MASS INDEX: 22.55 KG/M2

## 2024-03-06 DIAGNOSIS — G89.29 CHRONIC RIGHT SHOULDER PAIN: Primary | ICD-10-CM

## 2024-03-06 DIAGNOSIS — M25.511 CHRONIC RIGHT SHOULDER PAIN: Primary | ICD-10-CM

## 2024-03-06 DIAGNOSIS — Z09 SURGERY FOLLOW-UP: ICD-10-CM

## 2024-03-06 RX ORDER — METHYLPREDNISOLONE ACETATE 80 MG/ML
80 INJECTION, SUSPENSION INTRA-ARTICULAR; INTRALESIONAL; INTRAMUSCULAR; SOFT TISSUE
Status: COMPLETED | OUTPATIENT
Start: 2024-03-06 | End: 2024-03-06

## 2024-03-06 RX ORDER — LIDOCAINE HYDROCHLORIDE 10 MG/ML
2 INJECTION, SOLUTION EPIDURAL; INFILTRATION; INTRACAUDAL; PERINEURAL
Status: COMPLETED | OUTPATIENT
Start: 2024-03-06 | End: 2024-03-06

## 2024-03-06 RX ADMIN — LIDOCAINE HYDROCHLORIDE 2 ML: 10 INJECTION, SOLUTION EPIDURAL; INFILTRATION; INTRACAUDAL; PERINEURAL at 08:54

## 2024-03-06 RX ADMIN — METHYLPREDNISOLONE ACETATE 80 MG: 80 INJECTION, SUSPENSION INTRA-ARTICULAR; INTRALESIONAL; INTRAMUSCULAR; SOFT TISSUE at 08:54

## 2024-03-06 NOTE — PROGRESS NOTES
Chief complaint: Right shoulder pain    Cole is seen today in follow-up for his right shoulder.  He is now about 8 months out from his biceps tenodesis and labral debridement.  He says that the shoulder continues to cause him discomfort.  As he has been playing more guitar, he has been experiencing intermittent spasms.  Localizes the pain to the direct front of the shoulder right in the area of the biceps tenodesis.  The pain can be worse with certain reaching and lifting movements.  He says that he has good motion and strength.    Right shoulder: Skin is benign.  Portals are healed.  Focal tenderness at the biceps tenodesis site just beneath the anterior accessory portal.  No swellings or masses.  Full shoulder motion.  Excellent strength in his rotator cuff and deltoid.  Negative Neer, Cardenas, speeds, Yergason's and [place]'s maneuvers.    AP, scapular Y and axillary views right shoulder are ordered and reviewed evaluate his complaint.  These are compared to previous x-rays.  Postsurgical changes are as expected.  No new or concerning findings.    I did go back and look at his previous arthroscopy photos.  He had some chondral damage on the glenoid side.  No other significant findings.  Rotator cuff looked fine.  He had a biceps tenodesis with a tenodesis screw.    Assessment: Upper biceps groove pain status post biceps tenodesis.    Plan: I told him I think his pain is coming from the tenodesis site.  I told him I have seen this in the past with the tenodesis screws.  It is uncommon and I told him that it typically goes away after the screw dissolves.  I told him this could take a few years.  I suggested an injection may help to alleviate some of his pain.  He was very much interested in that option.  The risk, benefits and alternatives were discussed.  He consented and the injection was performed as described below.  He will follow-up as needed.      Large Joint Arthrocentesis  Date/Time: 3/6/2024 8:54  AM  Consent given by: patient  Site marked: site marked  Timeout: Immediately prior to procedure a time out was called to verify the correct patient, procedure, equipment, support staff and site/side marked as required   Supporting Documentation  Indications: pain   Procedure Details  Location: shoulder - Shoulder joint: bicep.  Preparation: Patient was prepped and draped in the usual sterile fashion  Needle size: 25 G  Approach: anterior  Medications administered: 80 mg methylPREDNISolone acetate 80 MG/ML; 2 mL lidocaine PF 1% 1 %  Patient tolerance: patient tolerated the procedure well with no immediate complications

## 2024-10-09 ENCOUNTER — OFFICE VISIT (OUTPATIENT)
Dept: ORTHOPEDIC SURGERY | Facility: CLINIC | Age: 44
End: 2024-10-09
Payer: COMMERCIAL

## 2024-10-09 VITALS — BODY MASS INDEX: 23.43 KG/M2 | TEMPERATURE: 98.6 F | HEIGHT: 66 IN | WEIGHT: 145.8 LBS

## 2024-10-09 DIAGNOSIS — M25.522 LEFT ELBOW PAIN: Primary | ICD-10-CM

## 2024-10-09 RX ORDER — HYDROXYZINE PAMOATE 25 MG/1
CAPSULE ORAL
COMMUNITY
Start: 2024-08-07

## 2024-10-09 RX ORDER — METHYLPREDNISOLONE ACETATE 80 MG/ML
80 INJECTION, SUSPENSION INTRA-ARTICULAR; INTRALESIONAL; INTRAMUSCULAR; SOFT TISSUE
Status: COMPLETED | OUTPATIENT
Start: 2024-10-09 | End: 2024-10-09

## 2024-10-09 RX ORDER — LIDOCAINE HYDROCHLORIDE 10 MG/ML
2 INJECTION, SOLUTION EPIDURAL; INFILTRATION; INTRACAUDAL; PERINEURAL
Status: COMPLETED | OUTPATIENT
Start: 2024-10-09 | End: 2024-10-09

## 2024-10-09 RX ADMIN — LIDOCAINE HYDROCHLORIDE 2 ML: 10 INJECTION, SOLUTION EPIDURAL; INFILTRATION; INTRACAUDAL; PERINEURAL at 09:02

## 2024-10-09 RX ADMIN — METHYLPREDNISOLONE ACETATE 80 MG: 80 INJECTION, SUSPENSION INTRA-ARTICULAR; INTRALESIONAL; INTRAMUSCULAR; SOFT TISSUE at 09:02

## 2024-10-09 NOTE — PROGRESS NOTES
Cole comes in today for follow-up for his left elbow.  Injections have worked well in the past.  He would like to get a repeat cortisone injection today.  We discussed the option of PRP.  He says the cost is a factor and he is not interested at this time.  The risks, benefits and alternatives were discussed and the patient consented.  Going forward, the patient will follow-up as needed.      Medium Joint Arthrocentesis: L elbow  Date/Time: 10/9/2024 9:02 AM  Consent given by: patient  Site marked: site marked  Timeout: Immediately prior to procedure a time out was called to verify the correct patient, procedure, equipment, support staff and site/side marked as required   Supporting Documentation  Indications: pain   Procedure Details  Location: elbow - L elbow  Preparation: Patient was prepped and draped in the usual sterile fashion  Needle size: 25 G  Approach: anterolateral  Medications administered: 2 mL lidocaine PF 1% 1 %; 80 mg methylPREDNISolone acetate 80 MG/ML  Patient tolerance: patient tolerated the procedure well with no immediate complications         Tommie Lindsey MD    10/09/2024

## 2025-03-21 ENCOUNTER — OFFICE VISIT (OUTPATIENT)
Dept: ORTHOPEDIC SURGERY | Facility: CLINIC | Age: 45
End: 2025-03-21
Payer: COMMERCIAL

## 2025-03-21 VITALS — HEIGHT: 66 IN | TEMPERATURE: 98 F | WEIGHT: 145 LBS | BODY MASS INDEX: 23.3 KG/M2

## 2025-03-21 DIAGNOSIS — M54.2 NECK PAIN: ICD-10-CM

## 2025-03-21 DIAGNOSIS — R20.0 NUMBNESS OF RIGHT HAND: ICD-10-CM

## 2025-03-21 DIAGNOSIS — M75.21 BICEPS TENDINITIS ON RIGHT: Primary | ICD-10-CM

## 2025-03-21 PROCEDURE — 99213 OFFICE O/P EST LOW 20 MIN: CPT | Performed by: NURSE PRACTITIONER

## 2025-03-21 PROCEDURE — 20610 DRAIN/INJ JOINT/BURSA W/O US: CPT | Performed by: NURSE PRACTITIONER

## 2025-03-21 PROCEDURE — 73030 X-RAY EXAM OF SHOULDER: CPT | Performed by: NURSE PRACTITIONER

## 2025-03-21 RX ORDER — CYCLOBENZAPRINE HCL 5 MG
5 TABLET ORAL 2 TIMES DAILY PRN
Qty: 30 TABLET | Refills: 0 | Status: SHIPPED | OUTPATIENT
Start: 2025-03-21

## 2025-03-21 RX ADMIN — METHYLPREDNISOLONE ACETATE 80 MG: 80 INJECTION, SUSPENSION INTRA-ARTICULAR; INTRALESIONAL; INTRAMUSCULAR; SOFT TISSUE at 16:20

## 2025-03-21 RX ADMIN — LIDOCAINE HYDROCHLORIDE 2 ML: 10 INJECTION, SOLUTION EPIDURAL; INFILTRATION; INTRACAUDAL; PERINEURAL at 16:20

## 2025-03-25 RX ORDER — LIDOCAINE HYDROCHLORIDE 10 MG/ML
2 INJECTION, SOLUTION EPIDURAL; INFILTRATION; INTRACAUDAL; PERINEURAL
Status: COMPLETED | OUTPATIENT
Start: 2025-03-21 | End: 2025-03-21

## 2025-03-25 RX ORDER — METHYLPREDNISOLONE ACETATE 80 MG/ML
80 INJECTION, SUSPENSION INTRA-ARTICULAR; INTRALESIONAL; INTRAMUSCULAR; SOFT TISSUE
Status: COMPLETED | OUTPATIENT
Start: 2025-03-21 | End: 2025-03-21

## 2025-03-25 NOTE — PROGRESS NOTES
"Chief Complaint:  Follow up right shoulder pain    HPI:  Mr. Mcdonald comes in today for right shoulder follow-up.  He had and arthroscopic biceps tenodesis and labral debridement by Dr. Lindsey in June 2023.  He was experiencing some biceps pain last March and had an injection by Dr. Lindsey which helped tremendously.  He says for about 8 weeks now he has experienced a burning sensation in the biceps region.  Reports radiating pain down his arm as well.  He recalls feeling a \"pop\" in his back while dangling from a pull up bar.  He says he prefers this method of stretching his back versus an inversion table.  He feels this may have been the precipitating factor to his current symptoms.  When asked to localize his pain, he gestures to the front of his shoulder.  Pain is worse with certain reaching and lifting motions.  Approximately 4 weeks ago, he began noticing pain in his right wrist as well.  He says he has intermittent numbness in the right index finger and thumb. 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  He reports currently experiencing some neck pain as well.      Vitals:    03/21/25 1515   Temp: 98 °F (36.7 °C)   Weight: 65.8 kg (145 lb)   Height: 167.6 cm (66\")     Exam:  Right shoulder is examined:  Skin is benign.  Portals are well healed and benign appearing.  No obvious abnormalities.  The contour of the biceps appears normal.  Focal tenderness at the biceps tenodesis site just beneath the anterior accessory portal.  Full shoulder motion.  Negative Neer, Cardenas, Speeds, Yergason's and [place]'s maneuvers.  Tinel's over the carpal tunnel reproduces localized pain in the wrist.  Intact sensation throughout the arm.  Good motor and sensory function distally.  Palpable radial pulse.  Brisk capillary refill.     Cervical spine:  No gross abnormalities on inspection.  Skin appears benign.  No palpable step-offs, masses or adenopathy.  Good neck motion.  Positive Spurling's to the right " which reproduces his hand numbness.       Imaging:  AP, scapular Y, and axillary views of the right shoulder are ordered by myself and reviewed to evaluate the patient's complaint.  No comparison films are immediately available.  The x-rays show no obvious acute abnormalities, lesions, masses, significant degenerative changes, or other concerning findings.  The acromiohumeral interval is normal.  Glenoid version appears normal as well.    Assessment:  1.  Chronic right shoulder pain, biceps tendinitis  2. Right hand numbness, possible cervical radiculopathy versus median nerve irritation  3.  Neck pain    Plan:  He seems to having some irritation of the biceps tendon.  I recommended trying a cortisone injection today.  He agreed.  The risks, benefits, and alternatives to the injection were discussed.  He consented to proceed.  The injection was performed as noted below.  I explained his right hand numbness and wrist pain may be associated with early carpal tunnel syndrome, however, there is a possibility he could have some symptoms coming from his neck.  I have given him a short course of Flexeril.  The risks were discussed.  I recommended he follow-up with [facility] for complete cervical spine evaluation.  He agreed.  I will enter this referral for him.  He may follow up as needed if his symptoms persist or worsen.     Esha Dan, SAMMY    03/21/2025    Large Joint Arthrocentesis - R Biceps tendon sheath  Date/Time: 3/21/2025 4:20 PM  Consent given by: patient  Site marked: site marked  Timeout: Immediately prior to procedure a time out was called to verify the correct patient, procedure, equipment, support staff and site/side marked as required   Supporting Documentation  Indications: pain   Procedure Details  Location: shoulder - Shoulder joint: Right biceps tendon sheath.  Preparation: Patient was prepped and draped in the usual sterile fashion  Needle size: 25 G  Approach: anterior  Medications  administered: 2 mL lidocaine PF 1% 1 %; 80 mg methylPREDNISolone acetate 80 MG/ML  Patient tolerance: patient tolerated the procedure well with no immediate complications

## 2025-03-26 NOTE — PROGRESS NOTES
Patient Name: Sandeep Mcdonald   YOB: 1980  Referring Primary Care Physician: Nikunj Manning DO      Chief Complaint:    Chief Complaint   Patient presents with    Cervical Spine - Initial Evaluation, Pain        Previous Treatment:   [facility] per [facility]    PT for neck pain? Yrs ago  Effective? Somewhat     MRI of C-Spine? No       Neck Pain   This is a chronic problem. The current episode started more than 1 year ago. The problem occurs constantly. The problem has been gradually worsening. The pain is present in the right side, midline and left side (Rt shoulder). The quality of the pain is described as aching, burning and stabbing (Sharp, deep, dull). The pain is at a severity of 4/10 (9/10 at worse). The pain is severe. Exacerbated by: sleeping. Stiffness is present All day. Associated symptoms include numbness and tingling. Associated symptoms comments: JAIR . He has tried chiropractic manipulation, acetaminophen, NSAIDs, muscle relaxants, ice and heat for the symptoms.        HPI:  Sandeep Mcdonald is a 44 y.o. male who presents to [facility] for evaluation of above complaints.  Primarily complaining of right neck and shoulder pain referring into the biceps as well as left elbow pain.  He also has paresthesias in the right 1st through 3rd digits.  He has utilized wrist splint at night but finds it more cumbersome to wear it.  He does try to keep his wrist in a neutral position while sleeping.  He no specific injury related to current symptoms.  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  He has had right shoulder arthroscopy with Dr. Angel 2023.  He still continues to have right shoulder and biceps pain.  With the paresthesia into the hand, he was referred for further evaluation of his cervical spine to see if there is involvement contributing to ongoing symptoms.  This is an established patient of the practice, new to me.  He is  unaccompanied today.  Prior pertinent records were reviewed.    PFSH:  See attached    ROS: As per HPI, otherwise negative    Objective:      44 y.o. male  Body mass index is 22.73 kg/m²., 63.9 kg (140 lb 12.8 oz), @HT@  Vitals:    03/28/25 0922   Temp: 97.8 °F (36.6 °C)     Pain Score    03/28/25 0922   PainSc: 4   Comment: 9/10 at worse   PainLoc: Neck            Spine Musculoskeletal Exam    Gait    Gait is normal.    Inspection    Coronal balance: no imbalance    Sagittal balance: no imbalance    Palpation    Cervical Spine    Tenderness: present    Range of Motion    Cervical Spine    Cervical spine range of motion is normal.         Sensory    Cervical Spine      Right      Thumb/index finger: decreased      Middle finger: decreased    Reflexes    Right        Biceps: 1/4      Brachioradialis: 1/4      Triceps: 1/4      [place]: absent    Left        Biceps: 1/4        Brachioradialis: 1/4      Triceps: 1/4      [place]: absent    Special Tests    Cervical Spine      Right      Tinel's - carpal tunnel: positive    General      Constitutional: well-developed and well-nourished    Scleral icterus: no    Labored breathing: no    Psychiatric: normal mood and affect and no acute distress    Neurological: alert and oriented x3    Skin: intact        IMAGING:     Indication: pain related symptoms,  Views: 2V AP&LAT cervical  Findings: Reviewed and reveals normal lordosis, fairly well-maintained disc spaces, no fractures or subluxation  Comparison views: None      Official radiology interpretation will follow and be available in the patient medical records. Patient will be notified of any pertinent discrepancies.    Assessment:           Diagnoses and all orders for this visit:    1. Numbness of right hand (Primary)  -     MRI Cervical Spine Without Contrast; Future    2. Neck pain  -     MRI Cervical Spine Without Contrast; Future             Plan:  He has been through physical therapy in the past for his neck and  his shoulder.  He keeps up with the exercises and sees a chiropractor on a regular basis.  He generally see the chiropractor every 2 or 3 weeks but over the past 2 months has been seeing the chiropractor at least weekly.  They do stretching and manipulations of the neck.  He is also recently given a prescription for massage.  He has tried multiple home remedies for the symptoms.  With the paresthesias in the hand, will rule out any nerve root impingement with cervical MRI.  Will call him with those results.  If there is nothing obvious on MRI, would recommend he proceed with EMG/NCV that he discussed with SAMMY Rebolledo.      Return for Call with results.    EMR Dragon/Transcription Disclaimer:   Much of this encounter note is an electronic transcription/translation of spoken language to printed text. The electronic translation of spoken language may permit erroneous, or at times, nonsensical words or phrases to be inadvertently transcribed; Although I have reviewed the note for such errors, some may still exist.  Red flags have been discussed at this or previous visits to include but not limited weakness in extremities, worsening pain that does not respond to conservative treatment and bowel or bladder dysfunction. These are reasons to present to ER and patient has been informed.    The diagnosis(es), natural history, pathophysiology and treatment for diagnosis(es) were discussed. Opportunity given and questions answered. Biomechanics of pertinent body areas discussed.    EXERCISES:  Advice on benefits of, and types of regular/moderate exercise pertaining to diagnosis.  Continue HEP. For back or neck pain, recommend pilates and or yoga as tolerated. Generally it is best to start any new exercise under the guidance of a  or therapist.   MEDICATIONS:  When prescribe, the risks, benefits, warnings,side effects and alternatives of medications discussed. Advised against long term use of narcotics.   PAIN  CONTROL:  Cold, heat, OTC lidocaine patches and/or ointment as needed. Avoid direct skin contact with ice. Ice 15-20 minutes 3-4 times daily as needed. For SI joint pain, recommend ice bath in water about 50 degrees for 5 consecutive days, add ice slowly to help with adjustment and may cover with warm towel or robe to help with cold tolerance. If using lidocaine, do not apply heat in conjunction as this can cause a burn.   MEDICAL RECORDS reviewed from other provider(s) for past and current medical history pertinent to this visit..

## 2025-03-28 ENCOUNTER — OFFICE VISIT (OUTPATIENT)
Dept: ORTHOPEDIC SURGERY | Facility: CLINIC | Age: 45
End: 2025-03-28
Payer: COMMERCIAL

## 2025-03-28 VITALS — HEIGHT: 66 IN | BODY MASS INDEX: 22.63 KG/M2 | TEMPERATURE: 97.8 F | WEIGHT: 140.8 LBS

## 2025-03-28 DIAGNOSIS — R20.0 NUMBNESS OF RIGHT HAND: Primary | ICD-10-CM

## 2025-03-28 DIAGNOSIS — M54.2 NECK PAIN: ICD-10-CM

## 2025-03-28 PROCEDURE — 99213 OFFICE O/P EST LOW 20 MIN: CPT | Performed by: NURSE PRACTITIONER

## 2025-03-31 ENCOUNTER — TELEPHONE (OUTPATIENT)
Dept: ORTHOPEDIC SURGERY | Facility: CLINIC | Age: 45
End: 2025-03-31
Payer: COMMERCIAL

## 2025-03-31 DIAGNOSIS — R20.0 NUMBNESS OF RIGHT HAND: Primary | ICD-10-CM

## 2025-03-31 NOTE — TELEPHONE ENCOUNTER
Please inform patient, I have order the right upper extremity nerve study.  I we will call him with the results and come up with a plan at that time.

## 2025-04-03 ENCOUNTER — TELEPHONE (OUTPATIENT)
Dept: ORTHOPEDIC SURGERY | Facility: CLINIC | Age: 45
End: 2025-04-03
Payer: COMMERCIAL

## 2025-04-03 NOTE — TELEPHONE ENCOUNTER
"Dr. Lindsey    RE: PRP injections     Message from patient....    \"I just got off the phone with [employer]. I spoke with a gentleman in the PA department. He said clearly that [employer] DOES pay for PRP injections. And he believes strongly that my injection would be approved if Dr Lindsey or a representative from the office calls [employer] for a [id number] discussion.      I told him that the office was certain insurance won’t cover this at all. The representative restated that the injection could/would be approved with a P2P discussion. He said he felt confident. I told him that was a bold statement to make considering the injection is investigational, but he just repeated that des DOES pay for PRP injections with a PA\"      Would like me to set up [id number] to discuss PRP denial?       "

## 2025-04-25 ENCOUNTER — TELEPHONE (OUTPATIENT)
Dept: ORTHOPEDIC SURGERY | Facility: CLINIC | Age: 45
End: 2025-04-25
Payer: COMMERCIAL

## 2025-04-25 ENCOUNTER — TELEPHONE (OUTPATIENT)
Dept: ORTHOPEDIC SURGERY | Facility: CLINIC | Age: 45
End: 2025-04-25

## 2025-04-25 NOTE — TELEPHONE ENCOUNTER
Caller: Sandeep Mcdonald    Relationship to patient: Self    Best call back number: [phone]    Chief complaint: LEFT ELBOW    Type of visit: PRP INJECTION    Requested date: ASAP

## 2025-04-25 NOTE — TELEPHONE ENCOUNTER
Hub staff attempted to follow warm transfer process and was unsuccessful     Caller: Sandeep Mcdonald    Relationship to patient: Self    Best call back number: [phone] (home)     Patient is needing: PATIENT RETURNING ANA LUISA CUTLER CALL FOR PRP INJECTION   PREVIOUS TE SIGNED - PLEASE ADVISE PATIENT

## 2025-04-28 ENCOUNTER — HOSPITAL ENCOUNTER (OUTPATIENT)
Dept: MRI IMAGING | Facility: HOSPITAL | Age: 45
Discharge: HOME | End: 2025-04-28
Payer: COMMERCIAL

## 2025-04-28 ENCOUNTER — HOSPITAL ENCOUNTER (OUTPATIENT)
Dept: NEUROLOGY | Facility: HOSPITAL | Age: 45
Discharge: HOME | End: 2025-04-28
Payer: COMMERCIAL

## 2025-04-28 DIAGNOSIS — M25.522 LEFT ELBOW PAIN: Primary | ICD-10-CM

## 2025-04-28 DIAGNOSIS — R20.0 NUMBNESS OF RIGHT HAND: ICD-10-CM

## 2025-04-28 DIAGNOSIS — M54.2 NECK PAIN: ICD-10-CM

## 2025-04-28 PROCEDURE — 72141 MRI NECK SPINE W/O DYE: CPT

## 2025-04-28 RX ORDER — PREDNISONE 20 MG/1
20 TABLET ORAL DAILY
Qty: 5 TABLET | Refills: 0 | Status: SHIPPED | OUTPATIENT
Start: 2025-04-28

## 2025-04-28 NOTE — PROCEDURES
"EMG and Nerve Conduction Studies    I.      Instrument used: Neuromax 1002  II.     Please see data sheets for tabular summary of NCS and details on methods, temperatures and lab standards.   III.    EMG muscles tested for upper extremity studies include the deltoid, biceps, triceps, pronator teres, extensor digitorum communis, first dorsal interosseous and abductor pollicis brevis.    IV.   EMG muscles tested for lower extremity studies include the vastus lateralis, tibialis anterior, peroneus longus, medial gastrocnemius and extensor digitorum brevis.    V.    Additional muscles tested as needed.  Paraspinal muscles tested as needed.   VI.   Please see data sheets for tabular summary of EMG findings.   VII. The complete report includes the data sheets.      Indication: Numbness in the right hand  History: 44-year-old male with numbness in the right hand mostly the first 2 digits especially the index finger.  He has had labral tears in the right shoulder and a biceps tenodesis.      Ht: 66 inches  Wt: 140 pounds  HbA1C: No results found for: \"HGBA1C\"  TSH:   Lab Results   Component Value Date    TSH 3.020 11/14/2023       Technical summary:  Nerve conduction studies were obtained in the right arm.  Skin temperatures were cold so the hand was warmed prior to study.  Temperatures were then 32 °C or more measured on the palm.  Needle examination was obtained on selected muscles in the right arm.    Results:  1.  Normal right median antidromic sensory distal latency and amplitude.  2.  Normal right median orthodromic palmar sensory distal latency and amplitude.  (The palmar method was included since carpal tunnel syndrome was on the differential list and the antidromic latency was normal.)  3.  Normal right ulnar antidromic sensory distal latency and amplitude.  4.  Normal right radial sensory distal latency and amplitude.  5.  Normal right lateral antebrachial cutaneous amplitude.  6.  Low medial antebrachial " cutaneous amplitudes bilaterally at 7.3 µV on the right and 9.0 µV on the left.  7.  Normal right median motor conduction velocity with a normal distal latency, amplitudes and F-wave.  8.  Normal right ulnar motor conduction velocities with a normal distal latency and amplitudes.  9.  Needle examination of selected muscles of the right arm showed normal insertional activities throughout with normal motor units and recruitment patterns throughout.  Cervical paraspinals at C6 showed no abnormality.      Impression:  Borderline study.  The medial antebrachial cutaneous amplitudes were low bilaterally.  This is sometimes seen in a mild lower trunk plexopathy however given that this finding is not in the same nerve distribution as the patient's symptoms it is more suspicious that this is a normal variant.  There is no evidence of a right median or ulnar neuropathy or right cervical radiculopathy by this study.  Study results were discussed with the patient.    Kevin Alvarado M.D.              Dictated utilizing Dragon dictation.

## 2025-04-30 ENCOUNTER — RESULTS FOLLOW-UP (OUTPATIENT)
Dept: ORTHOPEDIC SURGERY | Facility: CLINIC | Age: 45
End: 2025-04-30
Payer: COMMERCIAL

## 2025-05-09 ENCOUNTER — RESULTS FOLLOW-UP (OUTPATIENT)
Dept: ORTHOPEDIC SURGERY | Facility: CLINIC | Age: 45
End: 2025-05-09
Payer: COMMERCIAL

## 2025-05-09 NOTE — TELEPHONE ENCOUNTER
----- Message from Tommie Lindsey sent at 5/7/2025 10:54 AM EDT -----  Okay to call him with this result.  Please let him know that we can discuss when I see him back in the office on May 30.  Thanks.  ----- Message -----  From: Esha Dan APRN  Sent: 4/30/2025   3:26 PM EDT  To: Tommie Lindsey MD    Can you please call him with this result and discuss options?  He was having right wrist pain and numbness in his thumb and index finger.  He saw Renetta as well for neck pain and had a cervical MRI.    I do not see where she has discussed, but may be overlooking her note.  ----- Message -----  From: Kevin Alvarado MD  Sent: 4/28/2025  10:09 AM EDT  To: SAMMY Alexander

## 2025-05-09 NOTE — TELEPHONE ENCOUNTER
I spoke to Mr. Mcdonald.  I provided him with the nerve study results.  I recommended he keep his appointment with Dr. Lindsey on 5/30/2025.

## 2025-05-30 ENCOUNTER — CLINICAL SUPPORT (OUTPATIENT)
Dept: ORTHOPEDIC SURGERY | Facility: CLINIC | Age: 45
End: 2025-05-30
Payer: COMMERCIAL

## 2025-05-30 VITALS — HEIGHT: 66 IN | WEIGHT: 143.6 LBS | BODY MASS INDEX: 23.08 KG/M2 | TEMPERATURE: 98.6 F

## 2025-05-30 DIAGNOSIS — M77.12 LATERAL EPICONDYLITIS OF LEFT ELBOW: ICD-10-CM

## 2025-05-30 DIAGNOSIS — R52 PAIN: Primary | ICD-10-CM

## 2025-05-30 RX ORDER — LIDOCAINE HYDROCHLORIDE 10 MG/ML
2 INJECTION, SOLUTION EPIDURAL; INFILTRATION; INTRACAUDAL; PERINEURAL
Status: COMPLETED | OUTPATIENT
Start: 2025-05-30 | End: 2025-05-30

## 2025-05-30 RX ORDER — CYCLOBENZAPRINE HCL 10 MG
10 TABLET ORAL NIGHTLY PRN
Qty: 30 TABLET | Refills: 0 | Status: SHIPPED | OUTPATIENT
Start: 2025-05-30

## 2025-05-30 RX ORDER — GABAPENTIN 600 MG/1
TABLET ORAL
COMMUNITY
Start: 2025-05-27

## 2025-05-30 RX ORDER — DEXTROAMPHETAMINE SACCHARATE, AMPHETAMINE ASPARTATE, DEXTROAMPHETAMINE SULFATE AND AMPHETAMINE SULFATE 5; 5; 5; 5 MG/1; MG/1; MG/1; MG/1
TABLET ORAL
COMMUNITY
Start: 2025-05-27

## 2025-05-30 RX ADMIN — LIDOCAINE HYDROCHLORIDE 2 ML: 10 INJECTION, SOLUTION EPIDURAL; INFILTRATION; INTRACAUDAL; PERINEURAL at 16:21

## 2025-05-30 NOTE — PROGRESS NOTES
Cole comes in today for PRP injection for his left elbow.  Symptoms are unchanged.  AP and lateral views of his left elbow are ordered and reviewed.  These compared to previous x-rays.   I do not see any concerning findings on the x-rays.  The risk, benefits and alternatives to the PRP injection were discussed.  We did discuss current evidence on PRP and all the available literature on this topic.  He acknowledged this information and do wish to proceed.    Under sterile conditions, the blood draw was performed.  The centrifugation process allowed for extraction of approximately 4 cc of PRP.    The point of maximal tenderness was demarcated.  The area was carefully prepped and draped in the standard, sterile fashion.  The overlying skin of the point of maximal tenderness was infiltrated with 5 cc of 1% lidocaine.  I allowed this to work for approximately 3 minutes.  The area was reprepped.  The trephination of the tendon and injection of PRP was then carefully performed without complication.  The patient tolerated the procedure well.  A sterile dressing was applied.  I did explain that recent evidence suggest that this can take up to 6 months to fully work.  Again, the patient acknowledged understanding this information. He will follow-up as needed.  He did request a refill prescription for Flexeril.  Risk of this medicine were discussed.    Tommie Lindsey MD    05/30/2025      Medium Joint Arthrocentesis: L elbow  Date/Time: 5/30/2025 4:21 PM  Consent given by: patient  Site marked: site marked  Timeout: Immediately prior to procedure a time out was called to verify the correct patient, procedure, equipment, support staff and site/side marked as required   Supporting Documentation  Indications: pain   Procedure Details  Location: elbow - L elbow  Preparation: Patient was prepped and draped in the usual sterile fashion  Needle size: 25 G  Approach: volar  Medications administered: 2 mL lidocaine PF 1% 1  %  Patient tolerance: patient tolerated the procedure well with no immediate complications

## 2025-08-18 ENCOUNTER — OFFICE VISIT (OUTPATIENT)
Dept: ORTHOPEDIC SURGERY | Facility: CLINIC | Age: 45
End: 2025-08-18
Payer: COMMERCIAL

## 2025-08-18 VITALS — TEMPERATURE: 98.3 F | BODY MASS INDEX: 22.47 KG/M2 | HEIGHT: 66 IN | WEIGHT: 139.8 LBS

## 2025-08-18 DIAGNOSIS — M77.12 LATERAL EPICONDYLITIS OF LEFT ELBOW: Primary | ICD-10-CM

## 2025-08-18 PROCEDURE — 99213 OFFICE O/P EST LOW 20 MIN: CPT | Performed by: ORTHOPAEDIC SURGERY
